# Patient Record
Sex: FEMALE | Race: WHITE | Employment: UNEMPLOYED | URBAN - METROPOLITAN AREA
[De-identification: names, ages, dates, MRNs, and addresses within clinical notes are randomized per-mention and may not be internally consistent; named-entity substitution may affect disease eponyms.]

---

## 2020-09-21 ENCOUNTER — TELEPHONE (OUTPATIENT)
Dept: FAMILY MEDICINE CLINIC | Age: 25
End: 2020-09-21

## 2020-11-27 ENCOUNTER — HOSPITAL ENCOUNTER (OUTPATIENT)
Age: 25
Setting detail: SPECIMEN
Discharge: HOME | End: 2020-11-27
Payer: OTHER GOVERNMENT

## 2020-11-27 ENCOUNTER — OFFICE VISIT (OUTPATIENT)
Dept: PRIMARY CARE CLINIC | Age: 25
End: 2020-11-27
Payer: OTHER GOVERNMENT

## 2020-11-27 VITALS
OXYGEN SATURATION: 99 % | DIASTOLIC BLOOD PRESSURE: 84 MMHG | WEIGHT: 170 LBS | BODY MASS INDEX: 31.28 KG/M2 | HEIGHT: 62 IN | SYSTOLIC BLOOD PRESSURE: 117 MMHG | TEMPERATURE: 99.1 F | HEART RATE: 98 BPM | RESPIRATION RATE: 18 BRPM

## 2020-11-27 LAB — S PYO AG THROAT QL: NORMAL

## 2020-11-27 PROCEDURE — 99214 OFFICE O/P EST MOD 30 MIN: CPT | Performed by: NURSE PRACTITIONER

## 2020-11-27 PROCEDURE — 87880 STREP A ASSAY W/OPTIC: CPT | Performed by: NURSE PRACTITIONER

## 2020-11-27 ASSESSMENT — ENCOUNTER SYMPTOMS
SORE THROAT: 1
COUGH: 0
ALLERGIC/IMMUNOLOGIC NEGATIVE: 1
EYE DISCHARGE: 0
EYE ITCHING: 0
EYES NEGATIVE: 1
VOMITING: 0
SHORTNESS OF BREATH: 0
ABDOMINAL PAIN: 0
CHEST TIGHTNESS: 0
DIARRHEA: 1
NAUSEA: 0

## 2020-11-27 NOTE — PATIENT INSTRUCTIONS
Advance Care Planning  People with COVID-19 may have no symptoms, mild symptoms, such as fever, cough, and shortness of breath or they may have more severe illness, developing severe and fatal pneumonia. As a result, Advance Care Planning with attention to naming a health care decision maker (someone you trust to make healthcare decisions for you if you could not speak for yourself) and sharing other health care preferences is important BEFORE a possible health crisis. Please contact your Primary Care Provider to discuss Advance Care Planning. Preventing the Spread of Coronavirus Disease 2019 in Homes and Residential Communities  For the most recent information go to [email or web address]    Prevention steps for People with confirmed or suspected COVID-19 (including persons under investigation) who do not need to be hospitalized  and   People with confirmed COVID-19 who were hospitalized and determined to be medically stable to go home    Your healthcare provider and public health staff will evaluate whether you can be cared for at home. If it is determined that you do not need to be hospitalized and can be isolated at home, you will be monitored by staff from your local or state health department. You should follow the prevention steps below until a healthcare provider or local or state health department says you can return to your normal activities. Stay home except to get medical care  People who are mildly ill with COVID-19 are able to isolate at home during their illness. You should restrict activities outside your home, except for getting medical care. Do not go to work, school, or public areas. Avoid using public transportation, ride-sharing, or taxis. Separate yourself from other people and animals in your home  People: As much as possible, you should stay in a specific room and away from other people in your home.  Also, you should use a separate bathroom, if available. Animals: You should restrict contact with pets and other animals while you are sick with COVID-19, just like you would around other people. Although there have not been reports of pets or other animals becoming sick with COVID-19, it is still recommended that people sick with COVID-19 limit contact with animals until more information is known about the virus. When possible, have another member of your household care for your animals while you are sick. If you are sick with COVID-19, avoid contact with your pet, including petting, snuggling, being kissed or licked, and sharing food. If you must care for your pet or be around animals while you are sick, wash your hands before and after you interact with pets and wear a facemask. Call ahead before visiting your doctor  If you have a medical appointment, call the healthcare provider and tell them that you have or may have COVID-19. This will help the healthcare providers office take steps to keep other people from getting infected or exposed. Wear a facemask  You should wear a facemask when you are around other people (e.g., sharing a room or vehicle) or pets and before you enter a healthcare providers office. If you are not able to wear a facemask (for example, because it causes trouble breathing), then people who live with you should not stay in the same room with you, or they should wear a facemask if they enter your room. Cover your coughs and sneezes  Cover your mouth and nose with a tissue when you cough or sneeze. Throw used tissues in a lined trash can. Immediately wash your hands with soap and water for at least 20 seconds or, if soap and water are not available, clean your hands with an alcohol-based hand  that contains at least 60% alcohol.   Clean your hands often  Wash your hands often with soap and water for at least 20 seconds, especially after blowing your nose, coughing, or sneezing; going to the bathroom; and have a medical emergency and need to call 911, notify the dispatch personnel that you have, or are being evaluated for COVID-19. If possible, put on a facemask before emergency medical services arrive. Discontinuing home isolation  Patients with confirmed COVID-19 should remain under home isolation precautions until the risk of secondary transmission to others is thought to be low. The decision to discontinue home isolation precautions should be made on a case-by-case basis, in consultation with healthcare providers and state and local health departments.

## 2020-11-27 NOTE — PROGRESS NOTES
Pt is here for HA, fatigue, diarrhea, body aches, and sore throat. Unsure when most symptoms started. Diarrhea started yesterday, sore throat present 2-3 days.

## 2020-11-27 NOTE — PROGRESS NOTES
[facility]YSIC[facility]ANS  [facility]  [address] [place]  [address] [address]  Dept: [phone]  Dept Fax: [phone]    Chase Whitfield is a 31 y.o. female who presents today forher medical conditions/complaints as noted below. Chase Whitfield is c/o of   Chief Complaint   Patient presents with    Headache    Fatigue    Diarrhea    Pharyngitis    Generalized Body Aches       HPI:     Pharyngitis   This is a new problem. The current episode started in the past 7 days (x's 3 days ). The problem occurs constantly. The problem has been gradually worsening. Associated symptoms include fatigue, headaches, myalgias and a sore throat. Pertinent negatives include no abdominal pain, chest pain, chills, congestion, coughing, fever, nausea, neck pain, rash, vomiting or weakness. Associated symptoms comments: diarrhea . Exposure to Covid + person. No meds for symptoms. History reviewed. No pertinent past medical history. History reviewed. No pertinent surgical history. History reviewed. No pertinent family history. Social History     Tobacco Use    Smoking status: Never Smoker    Smokeless tobacco: Never Used   Substance Use Topics    Alcohol use: Not on file      No current outpatient medications on file. No current facility-administered medications for this visit. No Known Allergies        Subjective:      Review of Systems   Constitutional: Positive for fatigue. Negative for appetite change, chills and fever. HENT: Positive for sore throat. Negative for congestion and postnasal drip. Eyes: Negative. Negative for discharge and itching. Respiratory: Negative for cough, chest tightness and shortness of breath. Cardiovascular: Negative for chest pain, palpitations and leg swelling. Gastrointestinal: Positive for diarrhea. Negative for abdominal pain, nausea and vomiting. Endocrine: Negative.     Genitourinary: Negative for dysuria, frequency and urgency. Musculoskeletal: Positive for myalgias. Negative for neck pain and neck stiffness. Skin: Negative for rash. Allergic/Immunologic: Negative. Neurological: Positive for headaches. Negative for dizziness, weakness and light-headedness. Hematological: Negative for adenopathy. Psychiatric/Behavioral: Negative for confusion. Objective:      Physical Exam  Vitals signs reviewed. Constitutional:       Appearance: She is well-developed. HENT:      Head: Normocephalic. Right Ear: External ear normal.      Left Ear: External ear normal.      Nose: Nose normal.   Eyes:      Conjunctiva/sclera: Conjunctivae normal.      Pupils: Pupils are equal, round, and reactive to light. Neck:      Musculoskeletal: Normal range of motion and neck supple. Cardiovascular:      Rate and Rhythm: Normal rate and regular rhythm. Heart sounds: Normal heart sounds, S1 normal and S2 normal. No murmur. No friction rub. No gallop. Pulmonary:      Effort: Pulmonary effort is normal. No respiratory distress. Breath sounds: Normal breath sounds. No stridor, decreased air movement or transmitted upper airway sounds. No decreased breath sounds, wheezing, rhonchi or rales. Abdominal:      General: Bowel sounds are normal.      Palpations: Abdomen is soft. Musculoskeletal: Normal range of motion. Lymphadenopathy:      Cervical: No cervical adenopathy. Skin:     General: Skin is warm and dry. Findings: No rash. Neurological:      Mental Status: She is alert and oriented to person, place, and time. Cranial Nerves: No cranial nerve deficit. Coordination: Coordination normal.      Deep Tendon Reflexes: Reflexes are normal and symmetric. Psychiatric:         Thought Content:  Thought content normal.       /84 (Site: Right Upper Arm, Position: Sitting)   Pulse 98   Temp 99.1 °F (37.3 °C) (Temporal)   Resp 18   Ht 5' 2\" (1.575 m)   Wt 170 lb (77.1 kg)   SpO2 99%   BMI 31.09 kg/m²     Assessment:       Diagnosis Orders   1. Sore throat  POCT rapid strep A    COVID-19 Ambulatory   2. Acute intractable headache, unspecified headache type  COVID-19 Ambulatory   3. Fatigue, unspecified type  COVID-19 Ambulatory   4. Diarrhea, unspecified type  COVID-19 Ambulatory   5. Generalized body aches  COVID-19 Ambulatory   6. Suspected COVID-19 virus infection  COVID-19 Ambulatory           Plan:     1.) Covid swab obtained and sent to lab- will call with results   2.) Quarantine while waiting for Covid results   3.) Symptom management encouraged   4.) Follow-up with PCP PRN     Advance Care Planning  People with COVID-19 may have no symptoms, mild symptoms, such as fever, cough, and shortness of breath or they may have more severe illness, developing severe and fatal pneumonia. As a result, Advance Care Planning with attention to naming a health care decision maker (someone you trust to make healthcare decisions for you if you could not speak for yourself) and sharing other health care preferences is important BEFORE a possible health crisis. Please contact your Primary Care Provider to discuss Advance Care Planning. Preventing the Spread of Coronavirus Disease 2019 in Homes and Residential Communities  For the most recent information go to [email or web address]    Prevention steps for People with confirmed or suspected COVID-19 (including persons under investigation) who do not need to be hospitalized  and   People with confirmed COVID-19 who were hospitalized and determined to be medically stable to go home    Your healthcare provider and public health staff will evaluate whether you can be cared for at home. If it is determined that you do not need to be hospitalized and can be isolated at home, you will be monitored by staff from your local or state health department.  You should follow the prevention steps below until a healthcare provider or local or state health department says you can return to your normal activities. Stay home except to get medical care  People who are mildly ill with COVID-19 are able to isolate at home during their illness. You should restrict activities outside your home, except for getting medical care. Do not go to work, school, or public areas. Avoid using public transportation, ride-sharing, or taxis. Separate yourself from other people and animals in your home  People: As much as possible, you should stay in a specific room and away from other people in your home. Also, you should use a separate bathroom, if available. Animals: You should restrict contact with pets and other animals while you are sick with COVID-19, just like you would around other people. Although there have not been reports of pets or other animals becoming sick with COVID-19, it is still recommended that people sick with COVID-19 limit contact with animals until more information is known about the virus. When possible, have another member of your household care for your animals while you are sick. If you are sick with COVID-19, avoid contact with your pet, including petting, snuggling, being kissed or licked, and sharing food. If you must care for your pet or be around animals while you are sick, wash your hands before and after you interact with pets and wear a facemask. Call ahead before visiting your doctor  If you have a medical appointment, call the healthcare provider and tell them that you have or may have COVID-19. This will help the healthcare providers office take steps to keep other people from getting infected or exposed. Wear a facemask  You should wear a facemask when you are around other people (e.g., sharing a room or vehicle) or pets and before you enter a healthcare providers office.  If you are not able to wear a facemask (for example, because it causes trouble breathing), then people who live with you should not stay that you have, or are being evaluated for, COVID-19. Put on a facemask before you enter the facility. These steps will help the healthcare providers office to keep other people in the office or waiting room from getting infected or exposed. Ask your healthcare provider to call the local or state health department. Persons who are placed under active monitoring or facilitated self-monitoring should follow instructions provided by their local health department or occupational health professionals, as appropriate. When working with your local health department check their available hours. If you have a medical emergency and need to call 911, notify the dispatch personnel that you have, or are being evaluated for COVID-19. If possible, put on a facemask before emergency medical services arrive. Discontinuing home isolation  Patients with confirmed COVID-19 should remain under home isolation precautions until the risk of secondary transmission to others is thought to be low. The decision to discontinue home isolation precautions should be made on a case-by-case basis, in consultation with healthcare providers and state and local health departments. Problem List     None           Patient given educationalmaterials - see patient instructions. Discussed use, benefit, and side effectsof prescribed medications. All patient questions answered. Pt verbalized understanding. Instructed to continue current medications, diet and exercise. Patient agreedwith treatment plan. Follow up as directed.      Electronically signed by STEPHANIE Torres CNP on 11/27/2020 at 1:08 PM

## 2020-11-30 LAB — SARS-COV-2, NAA: NOT DETECTED

## 2020-12-02 ENCOUNTER — NURSE TRIAGE (OUTPATIENT)
Dept: OTHER | Facility: CLINIC | Age: 25
End: 2020-12-02

## 2020-12-02 ENCOUNTER — OFFICE VISIT (OUTPATIENT)
Dept: PRIMARY CARE CLINIC | Age: 25
End: 2020-12-02
Payer: OTHER GOVERNMENT

## 2020-12-02 VITALS
SYSTOLIC BLOOD PRESSURE: 128 MMHG | WEIGHT: 173 LBS | BODY MASS INDEX: 31.83 KG/M2 | TEMPERATURE: 99 F | OXYGEN SATURATION: 97 % | HEART RATE: 100 BPM | HEIGHT: 62 IN | DIASTOLIC BLOOD PRESSURE: 78 MMHG

## 2020-12-02 PROCEDURE — 99214 OFFICE O/P EST MOD 30 MIN: CPT | Performed by: NURSE PRACTITIONER

## 2020-12-02 RX ORDER — MECLIZINE HCL 12.5 MG/1
12.5 TABLET ORAL 3 TIMES DAILY PRN
Qty: 15 TABLET | Refills: 0 | Status: SHIPPED | OUTPATIENT
Start: 2020-12-02 | End: 2020-12-12

## 2020-12-02 RX ORDER — PREDNISONE 20 MG/1
20 TABLET ORAL DAILY
Qty: 5 TABLET | Refills: 0 | Status: SHIPPED | OUTPATIENT
Start: 2020-12-02 | End: 2020-12-07

## 2020-12-02 NOTE — PROGRESS NOTES
[facility] [address]  [address]  [place] [address]  Dept: [phone]       Unique Blue is a 31 y.o. female who presents to the office today for evaluation of Otalgia (today); Pharyngitis (x1 week); and Dizziness (x1 week)    Pt had negative strep test and negative covid-19 test on 2020. Dizziness   This is a recurrent problem. The current episode started more than 1 month ago. The problem occurs intermittently. Associated symptoms include congestion, fatigue and a sore throat. She has tried nothing for the symptoms. The treatment provided no relief. Pharyngitis   This is a new problem. The current episode started in the past 7 days. Associated symptoms include congestion, fatigue and a sore throat. Otalgia    There is pain in the right ear. This is a new problem. The current episode started yesterday. There has been no fever. Associated symptoms include rhinorrhea and a sore throat. There are no active problems to display for this patient. Patient's BMI is Body mass index is 31.64 kg/m². Social History     Tobacco Use    Smoking status: Never Smoker    Smokeless tobacco: Never Used   Substance Use Topics    Alcohol use: Not on file    Drug use: Not on file      History reviewed. No pertinent past medical history. PHQ-9 Total Score: 0 (12/3/2020 10:03 AM)     Immunization:    There is no immunization history on file for this patient. History reviewed. No pertinent surgical history. History reviewed. No pertinent family history. Current Outpatient Medications   Medication Sig Dispense Refill    Prenatal Multivit-Min-Fe-FA (PRE- PO) Take 1 tablet by mouth daily      predniSONE (DELTASONE) 20 MG tablet Take 1 tablet by mouth daily for 5 days 5 tablet 0    meclizine (ANTIVERT) 12.5 MG tablet Take 1 tablet by mouth 3 times daily as needed for Dizziness 15 tablet 0     No current facility-administered medications for this visit. The patient's past medical, surgical, social, and family history as well as her current medications and allergies were reviewed as documented in today's encounter. Review of Systems   Constitutional: Positive for fatigue. HENT: Positive for congestion, ear pain, postnasal drip, rhinorrhea and sore throat. Eyes: Negative. Respiratory: Negative. Cardiovascular: Negative. Gastrointestinal: Negative. Endocrine: Negative. Genitourinary: Negative. Musculoskeletal: Negative. Skin: Negative. Allergic/Immunologic: Positive for environmental allergies. Neurological: Positive for dizziness. Hematological: Negative. Psychiatric/Behavioral: Negative. All other systems reviewed and are negative. /78 (Site: Left Upper Arm, Position: Sitting, Cuff Size: Large Adult)   Pulse 100   Temp 99 °F (37.2 °C) (Temporal)   Ht 5' 2\" (1.575 m)   Wt 173 lb (78.5 kg)   LMP 11/14/2020   SpO2 97%   BMI 31.64 kg/m²   Physical Exam  Vitals signs and nursing note reviewed. Constitutional:       Appearance: Normal appearance. HENT:      Right Ear: Ear canal and external ear normal. A middle ear effusion is present. Tympanic membrane is retracted. Left Ear: Ear canal and external ear normal. A middle ear effusion is present. Tympanic membrane is retracted. Nose: Nose normal.      Mouth/Throat:      Lips: Pink. Mouth: Mucous membranes are moist.      Pharynx: Uvula midline. Posterior oropharyngeal erythema present. No oropharyngeal exudate or uvula swelling. Tonsils: No tonsillar exudate. Eyes:      Extraocular Movements: Extraocular movements intact. Conjunctiva/sclera: Conjunctivae normal.      Pupils: Pupils are equal, round, and reactive to light. Neck:      Musculoskeletal: Normal range of motion. Cardiovascular:      Rate and Rhythm: Normal rate and regular rhythm. Pulses: Normal pulses. Heart sounds: Normal heart sounds.    Pulmonary: Effort: Pulmonary effort is normal. No respiratory distress. Breath sounds: Normal breath sounds. No stridor. No wheezing. Abdominal:      General: Abdomen is flat. Bowel sounds are normal.      Palpations: Abdomen is soft. Tenderness: There is no abdominal tenderness. Musculoskeletal: Normal range of motion. Lymphadenopathy:      Cervical: No cervical adenopathy. Skin:     General: Skin is warm and dry. Capillary Refill: Capillary refill takes less than 2 seconds. Neurological:      General: No focal deficit present. Mental Status: She is alert. Psychiatric:         Mood and Affect: Mood normal.         Behavior: Behavior normal.         Thought Content: Thought content normal.       No results found for: WBC, HGB, HCT, MCV, PLT  No results found for: NA, K, CL, CO2, BUN, CREATININE, GLUCOSE, CALCIUM   No results found for: ALT, AST, GGT, ALKPHOS, BILITOT  No results found for: TSHFT4, TSH  No results found for: CHOL  No results found for: TRIG  No results found for: HDL  No results found for: LDLCALC, LDLCHOLESTEROL  No results found for: CHOLHDLRATIO  No results found for: LABA1C  No results found for: EVXIUMCM37  No results found for: FOLATE  No results found for: IRON, TIBC, FERRITIN  No results found for: VITD25  I personally reviewed testing with patient. Otherwise labs within normal limits  ASSESSMENT AND PLAN  1. Dizziness    - Basic Metabolic Panel; Future  - CBC With Auto Differential; Future  - Vitamin D 25 Hydroxy; Future  - Vitamin B12 & Folate; Future  - Iron And TIBC; Future  - TSH With Reflex Ft4; Future  - meclizine (ANTIVERT) 12.5 MG tablet; Take 1 tablet by mouth 3 times daily as needed for Dizziness  Dispense: 15 tablet; Refill: 0    2. Viral URI      3.  Fluid level behind tympanic membrane of both ears  Discussed trying allergy medication (zyrtec) daily for a few days first, if ear pain/dizziness persists, start prednisone  - predniSONE (DELTASONE) 20 MG tablet; Take 1 tablet by mouth daily for 5 days  Dispense: 5 tablet; Refill: 0    4. Screening for hyperlipidemia    - Lipid Panel; Future     Orders Placed This Encounter   Procedures    Basic Metabolic Panel     Standing Status:   Future     Standing Expiration Date:   12/2/2021    CBC With Auto Differential     Standing Status:   Future     Standing Expiration Date:   12/2/2021    Lipid Panel     Standing Status:   Future     Standing Expiration Date:   12/2/2021     Order Specific Question:   Is Patient Fasting?/# of Hours     Answer:   10 hours fasting    Vitamin D 25 Hydroxy     Standing Status:   Future     Standing Expiration Date:   12/2/2021    Vitamin B12 & Folate     Standing Status:   Future     Standing Expiration Date:   12/2/2021    Iron And TIBC     Standing Status:   Future     Standing Expiration Date:   12/2/2021     Order Specific Question:   Is Patient Fasting? Answer:   yes     Order Specific Question:   No of Hours? Answer:   10    TSH With Reflex Ft4     Standing Status:   Future     Standing Expiration Date:   12/2/2021     Orders Placed This Encounter   Medications    predniSONE (DELTASONE) 20 MG tablet     Sig: Take 1 tablet by mouth daily for 5 days     Dispense:  5 tablet     Refill:  0    meclizine (ANTIVERT) 12.5 MG tablet     Sig: Take 1 tablet by mouth 3 times daily as needed for Dizziness     Dispense:  15 tablet     Refill:  0      Data Unavailable  Health Maintenance Due   Topic Date Due    Varicella vaccine (1 of 2 - 2-dose childhood series) 06/07/1995    HPV vaccine (1 - 2-dose series) 06/07/2005    HIV screen  06/07/2009    DTaP/Tdap/Td vaccine (1 - Tdap) 06/07/2013    Cervical cancer screen  06/07/2015    Flu vaccine (1) 09/01/2020      There are no discontinued medications. The patient's past medical, surgical, social, and family history as well as her   current medications and allergies were reviewed as documented in today's encounter.     Medications, labs, diagnostic studies, consultations and follow-up as documented in this encounter. Return if symptoms worsen or fail to improve. No future appointments. This note was completed by using the assistance of a speech-recognition program. However, inadvertent computerized transcription errors may be present. Although every effort was made to ensure accuracy, no guarantees can be provided that every mistake has been identified and corrected by editing.   Electronically signed by STEPHANIE Sotelo CNP on 12/3/2020 at 10:06 AM

## 2020-12-02 NOTE — PATIENT INSTRUCTIONS
Patient Education        Dizziness: Care Instructions  Your Care Instructions  Dizziness is the feeling of unsteadiness or fuzziness in your head. It is different than having vertigo, which is a feeling that the room is spinning or that you are moving or falling. It is also different from lightheadedness, which is the feeling that you are about to faint. It can be hard to know what causes dizziness. Some people feel dizzy when they have migraine headaches. Sometimes bouts of flu can make you feel dizzy. Some medical conditions, such as heart problems or high blood pressure, can make you feel dizzy. Many medicines can cause dizziness, including medicines for high blood pressure, pain, or anxiety. If a medicine causes your symptoms, your doctor may recommend that you stop or change the medicine. If it is a problem with your heart, you may need medicine to help your heart work better. If there is no clear reason for your symptoms, your doctor may suggest watching and waiting for a while to see if the dizziness goes away on its own. Follow-up care is a key part of your treatment and safety. Be sure to make and go to all appointments, and call your doctor if you are having problems. It's also a good idea to know your test results and keep a list of the medicines you take. How can you care for yourself at home? · If your doctor recommends or prescribes medicine, take it exactly as directed. Call your doctor if you think you are having a problem with your medicine. · Do not drive while you feel dizzy. · Try to prevent falls. Steps you can take include:  ? Using nonskid mats, adding grab bars near the tub, and using night-lights. ? Clearing your home so that walkways are free of anything you might trip on.  ? Letting family and friends know that you have been feeling dizzy. This will help them know how to help you. When should you call for help? Call 911 anytime you think you may need emergency care.  For example, call if:    · You passed out (lost consciousness).     · You have dizziness along with symptoms of a heart attack. These may include:  ? Chest pain or pressure, or a strange feeling in the chest.  ? Sweating. ? Shortness of breath. ? Nausea or vomiting. ? Pain, pressure, or a strange feeling in the back, neck, jaw, or upper belly or in one or both shoulders or arms. ? Lightheadedness or sudden weakness. ? A fast or irregular heartbeat.     · You have symptoms of a stroke. These may include:  ? Sudden numbness, tingling, weakness, or loss of movement in your face, arm, or leg, especially on only one side of your body. ? Sudden vision changes. ? Sudden trouble speaking. ? Sudden confusion or trouble understanding simple statements. ? Sudden problems with walking or balance. ? A sudden, severe headache that is different from past headaches. Call your doctor now or seek immediate medical care if:    · You feel dizzy and have a fever, headache, or ringing in your ears.     · You have new or increased nausea and vomiting.     · Your dizziness does not go away or comes back. Watch closely for changes in your health, and be sure to contact your doctor if:    · You do not get better as expected. Where can you learn more? Go to [email or web address]://[email or web address] (formerly [employer] org and sign in to your [employer] account. Enter Q996 in the [facility] box to learn more about \"Dizziness: Care Instructions. \"     If you do not have an account, please click on the \"Sign Up Now\" link. Current as of: June 26, 2019               Content Version: 12.6  © 5866-9658 [employer]. Care instructions adapted under license by [facility] ([facility]). If you have questions about a medical condition or this instruction, always ask your healthcare professional. [place] [address] any warranty or liability for your use of this information.          Patient Education        Dizziness: Care Instructions  Your Care Instructions  Dizziness is the feeling of unsteadiness or fuzziness in your head. It is different than having vertigo, which is a feeling that the room is spinning or that you are moving or falling. It is also different from lightheadedness, which is the feeling that you are about to faint. It can be hard to know what causes dizziness. Some people feel dizzy when they have migraine headaches. Sometimes bouts of flu can make you feel dizzy. Some medical conditions, such as heart problems or high blood pressure, can make you feel dizzy. Many medicines can cause dizziness, including medicines for high blood pressure, pain, or anxiety. If a medicine causes your symptoms, your doctor may recommend that you stop or change the medicine. If it is a problem with your heart, you may need medicine to help your heart work better. If there is no clear reason for your symptoms, your doctor may suggest watching and waiting for a while to see if the dizziness goes away on its own. Follow-up care is a key part of your treatment and safety. Be sure to make and go to all appointments, and call your doctor if you are having problems. It's also a good idea to know your test results and keep a list of the medicines you take. How can you care for yourself at home? · If your doctor recommends or prescribes medicine, take it exactly as directed. Call your doctor if you think you are having a problem with your medicine. · Do not drive while you feel dizzy. · Try to prevent falls. Steps you can take include:  ? Using nonskid mats, adding grab bars near the tub, and using night-lights. ? Clearing your home so that walkways are free of anything you might trip on.  ? Letting family and friends know that you have been feeling dizzy. This will help them know how to help you. When should you call for help? Call 911 anytime you think you may need emergency care.  For example, call if:    · You passed out (lost

## 2020-12-02 NOTE — TELEPHONE ENCOUNTER
Reason for Disposition   Patient wants to be seen   Patient wants to be seen    Answer Assessment - Initial Assessment Questions  1. LOCATION: \"Which ear is involved? \"      Right ear    2. ONSET: \"When did the ear start hurting\"       Since this AM    3. SEVERITY: \"How bad is the pain? \"  (Scale 1-10; mild, moderate or severe)    - MILD (1-3): doesn't interfere with normal activities     - MODERATE (4-7): interferes with normal activities or awakens from sleep     - SEVERE (8-10): excruciating pain, unable to do any normal activities       5/10    4. URI SYMPTOMS: \"Do you have a runny nose or cough? \"      Sore throat, dizziness    5. FEVER: \"Do you have a fever? \" If so, ask: \"What is your temperature, how was it measured, and when did it start? \"      Denies    6. CAUSE: \"Have you been swimming recently? \", \"How often do you use Q-TIPS? \", \"Have you had any recent air travel or scuba diving? \"      Denies    7. OTHER SYMPTOMS: \"Do you have any other symptoms? \" (e.g., headache, stiff neck, dizziness, vomiting, runny nose, decreased hearing)      Sore throat, dizziness    8. PREGNANCY: \"Is there any chance you are pregnant? \" \"When was your last menstrual period? \"      LMP 2 weeks ago. 11/14/20    Answer Assessment - Initial Assessment Questions  1. DESCRIPTION: \"Describe your dizziness. \"      Lightheaded, woozy    2. LIGHTHEADED: \"Do you feel lightheaded? \" (e.g., somewhat faint, woozy, weak upon standing)      Yes    3. VERTIGO: \"Do you feel like either you or the room is spinning or tilting? \" (i.e. vertigo)      Denies    4. SEVERITY: \"How bad is it? \"  \"Do you feel like you are going to faint? \" \"Can you stand and walk? \"    - MILD - walking normally    - MODERATE - interferes with normal activities (e.g., work, school)     - SEVERE - unable to stand, requires support to walk, feels like passing out now. Mild    5. ONSET:  \"When did the dizziness begin? \"      Approx 1 week ago.      6. AGGRAVATING FACTORS: \"Does anything make it worse? \" (e.g., standing, change in head position)      walking too fast makes it worse. 7. HEART RATE: \"Can you tell me your heart rate? \" \"How many beats in 15 seconds? \"  (Note: not all patients can do this)        Unable to assess    8. CAUSE: \"What do you think is causing the dizziness? \"      Unknown     9. RECURRENT SYMPTOM: \"Have you had dizziness before? \" If so, ask: \"When was the last time? \" \"What happened that time? \"      Possible related to anxiety    10. OTHER SYMPTOMS: \"Do you have any other symptoms? \" (e.g., fever, chest pain, vomiting, diarrhea, bleeding)        Ear pain, sore throat    11. PREGNANCY: \"Is there any chance you are pregnant? \" \"When was your last menstrual period? \"        LMP 11/14/20    Protocols used: EARACHE-ADULT-[place], DIZZINESS-ADULT-OH    Pt reports having dizziness, sore throat, nausea, right ear pain. Pt was tested for covid on 11/27 and was negative. Reviewed for pt to have appt with PCP today or be seen at walk in clinic. Warm transfer to [place] in [facility] [address] [facility] provided care advice and instructed to call back with worsening symptoms. Attention Provider: Thank you for allowing me to participate in the care of your patient. The patient was connected to triage in response to information provided to the [facility]. Please do not respond through this encounter as the response is not directed to a shared pool.

## 2020-12-03 ASSESSMENT — PATIENT HEALTH QUESTIONNAIRE - PHQ9
SUM OF ALL RESPONSES TO PHQ QUESTIONS 1-9: 0
1. LITTLE INTEREST OR PLEASURE IN DOING THINGS: 0
SUM OF ALL RESPONSES TO PHQ QUESTIONS 1-9: 0
2. FEELING DOWN, DEPRESSED OR HOPELESS: 0
SUM OF ALL RESPONSES TO PHQ QUESTIONS 1-9: 0
SUM OF ALL RESPONSES TO PHQ9 QUESTIONS 1 & 2: 0

## 2020-12-03 ASSESSMENT — ENCOUNTER SYMPTOMS
RHINORRHEA: 1
RESPIRATORY NEGATIVE: 1
SORE THROAT: 1
EYES NEGATIVE: 1
GASTROINTESTINAL NEGATIVE: 1

## 2020-12-08 ENCOUNTER — HOSPITAL ENCOUNTER (OUTPATIENT)
Age: 25
Discharge: HOME | End: 2020-12-08
Payer: OTHER GOVERNMENT

## 2020-12-08 LAB
ABSOLUTE EOS #: 0.12 K/UL (ref 0–0.44)
ABSOLUTE IMMATURE GRANULOCYTE: <0.03 K/UL (ref 0–0.3)
ABSOLUTE LYMPH #: 2.61 K/UL (ref 1.1–3.7)
ABSOLUTE MONO #: 0.27 K/UL (ref 0.1–1.2)
ANION GAP SERPL CALCULATED.3IONS-SCNC: 14 MMOL/L (ref 9–17)
BASOPHILS # BLD: 1 % (ref 0–2)
BASOPHILS ABSOLUTE: 0.07 K/UL (ref 0–0.2)
BUN BLDV-MCNC: 8 MG/DL (ref 6–20)
BUN/CREAT BLD: NORMAL (ref 9–20)
CALCIUM SERPL-MCNC: 9.1 MG/DL (ref 8.6–10.4)
CHLORIDE BLD-SCNC: 104 MMOL/L (ref 98–107)
CHOLESTEROL/HDL RATIO: 3.8
CHOLESTEROL: 171 MG/DL
CO2: 22 MMOL/L (ref 20–31)
CREAT SERPL-MCNC: 0.57 MG/DL (ref 0.5–0.9)
DIFFERENTIAL TYPE: ABNORMAL
EOSINOPHILS RELATIVE PERCENT: 2 % (ref 1–4)
FOLATE: >20 NG/ML
GFR AFRICAN AMERICAN: >60 ML/MIN
GFR NON-AFRICAN AMERICAN: >60 ML/MIN
GFR SERPL CREATININE-BSD FRML MDRD: NORMAL ML/MIN/{1.73_M2}
GFR SERPL CREATININE-BSD FRML MDRD: NORMAL ML/MIN/{1.73_M2}
GLUCOSE BLD-MCNC: 84 MG/DL (ref 70–99)
HCT VFR BLD CALC: 45.3 % (ref 36.3–47.1)
HDLC SERPL-MCNC: 45 MG/DL
HEMOGLOBIN: 15.3 G/DL (ref 11.9–15.1)
IMMATURE GRANULOCYTES: 0 %
IRON SATURATION: 57 % (ref 20–55)
IRON: 188 UG/DL (ref 37–145)
LDL CHOLESTEROL: 106 MG/DL (ref 0–130)
LYMPHOCYTES # BLD: 37 % (ref 24–43)
MCH RBC QN AUTO: 30.6 PG (ref 25.2–33.5)
MCHC RBC AUTO-ENTMCNC: 33.8 G/DL (ref 28.4–34.8)
MCV RBC AUTO: 90.6 FL (ref 82.6–102.9)
MONOCYTES # BLD: 4 % (ref 3–12)
NRBC AUTOMATED: 0 PER 100 WBC
PDW BLD-RTO: 12.3 % (ref 11.8–14.4)
PLATELET # BLD: 178 K/UL (ref 138–453)
PLATELET ESTIMATE: ABNORMAL
PMV BLD AUTO: 12.7 FL (ref 8.1–13.5)
POTASSIUM SERPL-SCNC: 3.7 MMOL/L (ref 3.7–5.3)
RBC # BLD: 5 M/UL (ref 3.95–5.11)
RBC # BLD: ABNORMAL 10*6/UL
SEG NEUTROPHILS: 56 % (ref 36–65)
SEGMENTED NEUTROPHILS ABSOLUTE COUNT: 3.98 K/UL (ref 1.5–8.1)
SODIUM BLD-SCNC: 140 MMOL/L (ref 135–144)
TOTAL IRON BINDING CAPACITY: 332 UG/DL (ref 250–450)
TRIGL SERPL-MCNC: 98 MG/DL
TSH SERPL DL<=0.05 MIU/L-ACNC: 1.82 MIU/L (ref 0.3–5)
UNSATURATED IRON BINDING CAPACITY: 144 UG/DL (ref 112–347)
VITAMIN B-12: 598 PG/ML (ref 232–1245)
VITAMIN D 25-HYDROXY: 28 NG/ML (ref 30–100)
VLDLC SERPL CALC-MCNC: NORMAL MG/DL (ref 1–30)
WBC # BLD: 7.1 K/UL (ref 3.5–11.3)
WBC # BLD: ABNORMAL 10*3/UL

## 2020-12-08 PROCEDURE — 80061 LIPID PANEL: CPT

## 2020-12-08 PROCEDURE — 82746 ASSAY OF FOLIC ACID SERUM: CPT

## 2020-12-08 PROCEDURE — 83540 ASSAY OF IRON: CPT

## 2020-12-08 PROCEDURE — 82306 VITAMIN D 25 HYDROXY: CPT

## 2020-12-08 PROCEDURE — 82607 VITAMIN B-12: CPT

## 2020-12-08 PROCEDURE — 36415 COLL VENOUS BLD VENIPUNCTURE: CPT

## 2020-12-08 PROCEDURE — 83550 IRON BINDING TEST: CPT

## 2020-12-08 PROCEDURE — 85025 COMPLETE CBC W/AUTO DIFF WBC: CPT

## 2020-12-08 PROCEDURE — 84443 ASSAY THYROID STIM HORMONE: CPT

## 2020-12-08 PROCEDURE — 80048 BASIC METABOLIC PNL TOTAL CA: CPT

## 2020-12-18 ENCOUNTER — TELEPHONE (OUTPATIENT)
Dept: PRIMARY CARE CLINIC | Age: 25
End: 2020-12-18

## 2020-12-18 NOTE — TELEPHONE ENCOUNTER
Pt needs an order for covid test ... pt stated that she called yesterday for her  Son who has an order and now she needs to be tested per having symptoms. .. pau bowen [birth year]. ...

## 2020-12-18 NOTE — TELEPHONE ENCOUNTER
I called pt per request of the placed order for a covid test and pt was unavailable and was left a message. ... Pt was also informed  to call us back if any further question needed to be answered.

## 2021-02-08 ENCOUNTER — TELEPHONE (OUTPATIENT)
Dept: PRIMARY CARE CLINIC | Age: 26
End: 2021-02-08

## 2021-02-08 DIAGNOSIS — Z01.419 WELL WOMAN EXAM: Primary | ICD-10-CM

## 2021-02-11 ENCOUNTER — INITIAL PRENATAL (OUTPATIENT)
Dept: OBGYN CLINIC | Age: 26
End: 2021-02-11
Payer: OTHER GOVERNMENT

## 2021-02-11 VITALS — SYSTOLIC BLOOD PRESSURE: 120 MMHG | WEIGHT: 188 LBS | BODY MASS INDEX: 34.39 KG/M2 | DIASTOLIC BLOOD PRESSURE: 62 MMHG

## 2021-02-11 DIAGNOSIS — Z3A.08 8 WEEKS GESTATION OF PREGNANCY: ICD-10-CM

## 2021-02-11 DIAGNOSIS — Z34.91 NORMAL PREGNANCY IN FIRST TRIMESTER: Primary | ICD-10-CM

## 2021-02-11 PROCEDURE — 99203 OFFICE O/P NEW LOW 30 MIN: CPT | Performed by: NURSE PRACTITIONER

## 2021-02-11 RX ORDER — UBIDECARENONE 75 MG
50 CAPSULE ORAL DAILY
COMMUNITY
End: 2021-10-07 | Stop reason: ALTCHOICE

## 2021-02-11 RX ORDER — ACETAMINOPHEN 160 MG
TABLET,DISINTEGRATING ORAL
COMMUNITY

## 2021-02-11 NOTE — PROGRESS NOTES
OB History    Para Term  AB Living   3       1 1   SAB TAB Ectopic Molar Multiple Live Births   1                # Outcome Date GA Lbr Keith/2nd Weight Sex Delivery Anes PTL Lv   3 Current            2 SAB            1                Chichi Roblero is being seen today for her new obstetrical visit. The pregnancy was not, not planned  Pregnancy. Her  is being deployed for 3 months in May. She is  accepting at this time. Her last period was Patient's last menstrual period was 2020. Alanna Walters This was a sure and definite menses. She was not on OCP at conception. Gestation 8w2d  Estimated Date of Delivery: 21  Last PAP 2020- negative hx abnormal: denies    Full term vaginal delivery- Son    Plans to deliver at: unsure yet    SO/Partner:  Ethan Rodriguez-   Involved:  yes  Patient Ethnicity:    FOB Ethnicity:         Occupation:  Does not work outside the home       Pets:  none    Teratogen exposure since LMP: (OTC/prescription/ETOH/drugs):  denies    History of prior GBS-infected child: unknown  Recent travel outside of country (partner also):  no    Known COVID/Zika exposure (partner also): they had covid in 2020  Any history of genetic or chromosomal aberations in herself the father to be or their respective families: no  Any histories of spina bifida or abdominal wall defects; omphalocele's or gastroschisis no  Hx Hypothyroidism: denies  Hx preeclampsia or HTN:  denies  Hx PPD: denies  Hx Diabetes: denies  Hx GDM: denies  First degree relative with diabetes: denies           No Known Allergies  Current Outpatient Medications   Medication Sig Dispense Refill    Prenatal Multivit-Min-Fe-FA (PRE-LAITH PO) Take 1 tablet by mouth daily       No current facility-administered medications for this visit. No past medical history on file. No past surgical history on file.       Swelling: no  Bleeding: no  Discharge: no   Dysuria: no Nausea: yes -  encouraged small frequent meals, and vitamin B6 and unisom if needed. Heartburn: no  Epigastric pain: no       Office protocol reviewed, After hours number provided. Diet and exercise reveiwed  Warning signs reviewed  Testing reviewed     Q&A  Discussed in detail referral/orders for  for 1st trimester screen vs NIPSinfo provided for screening  Discussed with patient that if they proceed with the NIPs testing then they will be opting out of 1st trimester screening which can provide information in regards to placental health, congenital heart defects, metabolic abnormalities, and anatomic abnormalities. Patient is aware and has decided to: declined  Discussed dates and orders for Anatomy USd and fetal echo if indicated. Breastfeeding education. She verbally consented to HIV testing and drug screening. She was counseled on Toxoplasmosis/Listeriosis (cats/raw meat). Prenatal Vitamins recommended. Prenatal labs and dating us ordered. RV prn/ 4 weeks     Of the 30 minute duration appointment visit, Adenike Ferrari CNP spent at least 50% of the face-to-face time in counseling, explanation of diagnosis, planning of further management, and answering all questions.

## 2021-02-11 NOTE — PATIENT INSTRUCTIONS
After Hours Number: You can call the office [phone]  or [phone]  Call if you have:  1. Bleeding like a period  2. Cramps or contractions greater than 2 hours  3. If you are leaking fluid  4. If you've a fever greater than 100°  5. If you feel as if baby is not moving  6. If you have continuous vomiting over 3-4 hours        Patient was counseled on weight gain in pregnancy with the following recommendation made based on her BMI:     Singletons:  BMI <18.5: 28-40 lbs weight gain  BMI 18.5-24.9: 25-35 lbs weight gain   BMI 25-29.9: 15-25 lbs weight gain  BMI >/= 30: 11-20 lbs weight gain    Up to 16 weeks around 1 pound a month  16-28 weeks- 2-4 pounds a month  >28 weeks - around 1 pound a week due to increased growth and blood volume      Twins:  BMI <18.5: no reccomendations  BMI 18.5-24.9: 37-45 lbs weight gain  BMI 25-29.9: 31-50 lbs weight gain  BMI >/= 30: 25-42 lbs weight gain    During Pregnancy: Exercises  Your Care Instructions  Here are some examples of exercises to do during your pregnancy. Start each exercise slowly. Ease off the exercise if you start to have pain. Your doctor or physical therapist will tell you when you can start these exercises and which ones will work best for you. How to do the exercises  Neck rotation    1. Sit in a firm chair, or stand up straight. 2. Keeping your chin level, turn your head to the right, and hold for 15 to 30 seconds. 3. Turn your head to the left and hold for 15 to 30 seconds. 4. Repeat 2 to 4 times to each side. Forward neck flexion    1. Sit in a firm chair, or stand up straight. 2. Bend your head forward. 3. Hold for 15 to 30 seconds. 4. Repeat 2 to 4 times. Back press    1. Place your feet 10 to 12 inches from the wall. 2. Rest your back flat against the wall and slide down the wall until your knees are slightly bent. 3. Press your lower back against the wall by pulling in your stomach muscles. 4. Hold for 6 seconds, and then relax your stomach muscles and slide back up the wall. 5. Repeat 8 to 12 times. Full body twist    1. Sit with your legs crossed. 2. Reach your left hand toward your right foot, and place your right hand at your side for support. 3. Slowly twist your torso to your right. 4. Switch your hands and twist to your left. 5. Repeat 2 to 4 times. Pelvic rocking    1. Kneeling on hands and knees, place your hands directly under your shoulders and your knees under your hips. 2. Breathe in deeply. Tuck your head downward and round your back up, making a curve with your back in the shape of the letter C. Hold this position for a count of 6.  3. Breathe out slowly and bring your head back up. Relax, keeping your back straight (don't allow it to curve toward the floor). Hold this for a count of 6.  4. Do this exercise 8 times or to your comfort level. Pelvic tilt    1. Lie on your back. 2. Keep your knees relaxed. 3. Tighten your belly and buttocks muscles. 4. At the same time, gently shift your pelvis upward. This should flatten the curve in your back. 5. Hold for 6 seconds and then relax. 6. Gradually increase the number of tilts you do each day, to your comfort level. Backward stretch    1. Kneel on hands and knees with your knees 8 to 10 inches apart, hands directly under your shoulders, and arms and back straight. 2. Keeping your arms straight, slowly lower your buttocks toward your heels and tuck your head toward your knees. Hold for 15 to 30 seconds. 3. Slowly return to the kneeling position. 4. Repeat 2 to 4 times. Forward bend    1. Sit comfortably in a chair, with your arms relaxed. 2. Slowly bend forward, allowing your arms to hang down in front of you. Lean only as far as you can without feeling discomfort or pressure on your belly. 3. Hold for 15 to 30 seconds and then slowly sit up straight. 4. Repeat 2 to 4 times or to your comfort level. Leg lift crawl    1. Kneeling on hands and knees, place your hands directly under your shoulders and straighten your arms. 2. Tighten your belly muscles by pulling in your belly button toward your spine. Be sure you continue to breathe normally and do not hold your breath. 3. Lift your left knee and bring it toward your elbow. 4. Slowly extend your leg behind you without completely straightening it. Be careful not to let your hip drop down. Avoid arching your back. 5. Hold your leg behind you for about 6 seconds. 6. Return to your starting position. 7. Do the same exercise with your other leg. 8. Repeat 8 to 12 times for each leg. Tailor sitting    1. Sit on the floor. 2. Bring your feet close to your body while crossing your ankles. 3. Hold this position for as long as you are comfortable. Tailor stretching    1. Sit on the floor with your back straight, legs about 12 inches apart, and feet relaxed outward. 2. Stretch your hands forward toward your left foot, then sit up. 3. Stretch your hands straight forward, then sit up. 4. Stretch your hands forward toward your right foot, then sit up. 5. Hold each stretch for 15 to 30 seconds. 6. Repeat 2 to 4 times. Follow-up care is a key part of your treatment and safety. Be sure to make and go to all appointments, and call your doctor if you are having problems. It's also a good idea to know your test results and keep a list of the medicines you take. Where can you learn more? Go to [email or web address]://halie.[email or web address] org and sign in to your LegalFÃ¡cil account. Enter S494 in the [facility] box to learn more about \"During Pregnancy: Exercises. \"     If you do not have an account, please click on the \"Sign Up Now\" link.   Current as of: September 5, 2018  Content Version: 12.0 Go to [email or web address] and sign in to your MobileSpan account. Enter Y785 in the [facility] box to learn more about \"Nutrition During Pregnancy: Care Instructions. \"     If you do not have an account, please click on the \"Sign Up Now\" link. Current as of: September 5, 2018  Content Version: 12.0  © 0912-9774 EPS. Care instructions adapted under license by [facility] ([facility]). If you have questions about a medical condition or this instruction, always ask your healthcare professional. [place] [address] any warranty or liability for your use of this information. Managing Morning Sickness: Care Instructions  Your Care Instructions    For many women, the toughest part of early pregnancy is morning sickness. Morning sickness can range from mild nausea to severe nausea with bouts of vomiting. Symptoms may be worse in the morning, although they can strike at any time of the day or night. If you have nausea, vomiting, or both, look for safe measures that can bring you relief. You can take simple steps at home to manage morning sickness. These steps include changing what and when you eat and avoiding certain foods and smells. Some women find that acupuncture and acupressure wristbands also help. Follow-up care is a key part of your treatment and safety. Be sure to make and go to all appointments, and call your doctor if you are having problems. It's also a good idea to know your test results and keep a list of the medicines you take. How can you care for yourself at home? · Keep food in your stomach, but not too much at once. Your nausea may be worse if your stomach is empty. Eat five or six small meals a day instead of three large meals. · For morning nausea, eat a small snack, such as a couple of crackers or dry biscuits, before rising. Allow a few minutes for your stomach to settle before you get out of bed slowly. Go to [email or web address] and sign in to your HireArt account. Enter Z844 in the [facility] box to learn more about \"Managing Morning Sickness: Care Instructions. \"     If you do not have an account, please click on the \"Sign Up Now\" link. Current as of: September 5, 2018  Content Version: 12.0  © 8822-3118 [employer]. Care instructions adapted under license by [facility] ([facility]). If you have questions about a medical condition or this instruction, always ask your healthcare professional. [place] [address] any warranty or liability for your use of this information. Breastfeeding: Care Instructions  Overview      Breastfeeding has many benefits. It may lower your baby's chances of getting an infection. It also may make it less likely that your baby will have problems such as diabetes and obesity later in life. Breastfeeding also helps you bond with your baby. In the first days after birth, your breasts make a thick, yellow liquid called colostrum. This liquid gives your baby nutrients and antibodies against infection. It is all that babies need in the first days after birth. Your breasts will fill with milk a few days after the birth. Breastfeeding is a skill that gets better with practice. Be patient with yourself and your baby. If you have trouble, you can get help and keep breastfeeding. Follow-up care is a key part of your treatment and safety. Be sure to make and go to all appointments, and call your doctor if you are having problems. It's also a good idea to know your test results and keep a list of the medicines you take. How can you care for yourself at home? · Breastfeed your baby whenever he or she is hungry. In the first 2 weeks, your baby will feed about every 1 to 3 hours. That often works out to about 8 to 12 times in a 24-hour period. This will help you keep up your supply of milk.  Signs that your baby is hungry include: · Screening tests and when they are done:  ? Blood tests at 10 to 13 weeks (first trimester)  ? Cell free fetal DNA test at 10 weeks or later (first trimester)  ? Nuchal translucency test at 11 to 14 weeks (first trimester)  ? Triple or quad screening at 15 to 20 weeks (second trimester)  ? Ultrasound at 18 to 20 weeks (second trimester)  · Diagnostic tests and when they are done:  ? Chorionic villus sampling (CVS) at 10 to 13 weeks (first trimester)  ? Amniocentesis at 13 to 20 weeks (second trimester)  In some cases, the doctors look at the combined screenings that you've had over a period of time. This is called an integrated screening. What are the screening tests? · Blood tests are done to look at different substances in your blood. These tests include cell free fetal DNA and triple or quadruple (quad) blood tests. Both of these tests can help find genetic problems. · Nuchal translucency test uses ultrasound to measure the thickness of the area at the back of the baby's neck. An increase in thickness can be an early sign of certain birth defects. Ultrasound is a tool that uses sound waves to make pictures of your baby and placenta inside the uterus. · Ultrasound lets your doctor see an image of your baby. It can help your doctor look for problems of the heart, spine, belly, or other areas. What are the diagnostic tests? Chorionic villus sampling (CVS) looks at cells from the placenta. To do the test, your doctor may put a thin tube through your vagina and cervix to take out a small piece of the placenta. Or the doctor may take out the piece through a needle in your belly. This test can diagnose many genetic diseases. But it can't find problems with the spinal cord. Amniocentesis looks at the amniotic fluid that surrounds your baby. Your doctor will put a needle through your belly into your uterus and take out a very small amount of fluid to test.  What are the risks of these tests? There is a small risk of a miscarriage after a CVS or amniocentesis. Your doctor or genetic counselor can help you understand this risk. These tests are generally very safe. Where can you learn more? Go to https://Kewl InnovationspeWabi Sabi Ecofashionconcept.Ffrees Family [email or web address] org and sign in to your Powered account. Enter G030 in the [employer] box to learn more about \"Learning About Birth Defects Testing. \"     If you do not have an account, please click on the \"Sign Up Now\" link. Current as of: September 5, 2018  Content Version: 12.0  © 7249-1837 Healthwise, Incorporated. Care instructions adapted under license by [facility] ([facility]). If you have questions about a medical condition or this instruction, always ask your healthcare professional. [address] any warranty or liability for your use of this information.

## 2021-02-24 ENCOUNTER — HOSPITAL ENCOUNTER (OUTPATIENT)
Age: 26
Setting detail: SPECIMEN
Discharge: HOME | End: 2021-02-24
Payer: OTHER GOVERNMENT

## 2021-02-24 DIAGNOSIS — Z34.91 NORMAL PREGNANCY IN FIRST TRIMESTER: ICD-10-CM

## 2021-02-24 DIAGNOSIS — Z3A.08 8 WEEKS GESTATION OF PREGNANCY: ICD-10-CM

## 2021-02-24 LAB
-: ABNORMAL
ABO/RH: NORMAL
ABSOLUTE EOS #: 0.03 K/UL (ref 0–0.44)
ABSOLUTE IMMATURE GRANULOCYTE: 0.03 K/UL (ref 0–0.3)
ABSOLUTE LYMPH #: 1.91 K/UL (ref 1.1–3.7)
ABSOLUTE MONO #: 0.35 K/UL (ref 0.1–1.2)
AMORPHOUS: ABNORMAL
AMPHETAMINE SCREEN URINE: NEGATIVE
ANTIBODY SCREEN: NEGATIVE
BACTERIA: ABNORMAL
BARBITURATE SCREEN URINE: NEGATIVE
BASOPHILS # BLD: 1 % (ref 0–2)
BASOPHILS ABSOLUTE: 0.04 K/UL (ref 0–0.2)
BENZODIAZEPINE SCREEN, URINE: NEGATIVE
BILIRUBIN URINE: NEGATIVE
BUPRENORPHINE URINE: NORMAL
CANNABINOID SCREEN URINE: NEGATIVE
CASTS UA: ABNORMAL /LPF (ref 0–8)
COCAINE METABOLITE, URINE: NEGATIVE
COLOR: YELLOW
COMMENT UA: ABNORMAL
CRYSTALS, UA: ABNORMAL /HPF
DIFFERENTIAL TYPE: ABNORMAL
EOSINOPHILS RELATIVE PERCENT: 0 % (ref 1–4)
EPITHELIAL CELLS UA: ABNORMAL /HPF (ref 0–5)
GLUCOSE URINE: NEGATIVE
HCT VFR BLD CALC: 43.9 % (ref 36.3–47.1)
HEMOGLOBIN: 15.2 G/DL (ref 11.9–15.1)
HEPATITIS B SURFACE ANTIGEN: NONREACTIVE
HIV AG/AB: NONREACTIVE
IMMATURE GRANULOCYTES: 0 %
KETONES, URINE: NEGATIVE
LEUKOCYTE ESTERASE, URINE: ABNORMAL
LYMPHOCYTES # BLD: 24 % (ref 24–43)
MCH RBC QN AUTO: 30.8 PG (ref 25.2–33.5)
MCHC RBC AUTO-ENTMCNC: 34.6 G/DL (ref 28.4–34.8)
MCV RBC AUTO: 88.9 FL (ref 82.6–102.9)
MDMA URINE: NORMAL
METHADONE SCREEN, URINE: NEGATIVE
METHAMPHETAMINE, URINE: NORMAL
MONOCYTES # BLD: 4 % (ref 3–12)
MUCUS: ABNORMAL
NITRITE, URINE: NEGATIVE
NRBC AUTOMATED: 0 PER 100 WBC
OPIATES, URINE: NEGATIVE
OTHER OBSERVATIONS UA: ABNORMAL
OXYCODONE SCREEN URINE: NEGATIVE
PDW BLD-RTO: 12.5 % (ref 11.8–14.4)
PH UA: 6 (ref 5–8)
PHENCYCLIDINE, URINE: NEGATIVE
PLATELET # BLD: 184 K/UL (ref 138–453)
PLATELET ESTIMATE: ABNORMAL
PMV BLD AUTO: 13 FL (ref 8.1–13.5)
PROPOXYPHENE, URINE: NORMAL
PROTEIN UA: NEGATIVE
RBC # BLD: 4.94 M/UL (ref 3.95–5.11)
RBC # BLD: ABNORMAL 10*6/UL
RBC UA: ABNORMAL /HPF (ref 0–4)
RENAL EPITHELIAL, UA: ABNORMAL /HPF
RUBV IGG SER QL: 32.2 IU/ML
SEG NEUTROPHILS: 71 % (ref 36–65)
SEGMENTED NEUTROPHILS ABSOLUTE COUNT: 5.72 K/UL (ref 1.5–8.1)
SPECIFIC GRAVITY UA: 1.02 (ref 1–1.03)
T. PALLIDUM, IGG: NONREACTIVE
TEST INFORMATION: NORMAL
TRICHOMONAS: ABNORMAL
TRICYCLIC ANTIDEPRESSANTS, UR: NORMAL
TURBIDITY: CLEAR
URINE HGB: NEGATIVE
UROBILINOGEN, URINE: NORMAL
WBC # BLD: 8.1 K/UL (ref 3.5–11.3)
WBC # BLD: ABNORMAL 10*3/UL
WBC UA: ABNORMAL /HPF (ref 0–5)
YEAST: ABNORMAL

## 2021-02-25 ENCOUNTER — ROUTINE PRENATAL (OUTPATIENT)
Dept: OBGYN CLINIC | Age: 26
End: 2021-02-25

## 2021-02-25 VITALS — SYSTOLIC BLOOD PRESSURE: 118 MMHG | DIASTOLIC BLOOD PRESSURE: 64 MMHG | WEIGHT: 190.5 LBS | BODY MASS INDEX: 34.84 KG/M2

## 2021-02-25 DIAGNOSIS — Z3A.10 10 WEEKS GESTATION OF PREGNANCY: ICD-10-CM

## 2021-02-25 DIAGNOSIS — Z34.91 NORMAL PREGNANCY IN FIRST TRIMESTER: Primary | ICD-10-CM

## 2021-02-25 DIAGNOSIS — O21.9 NAUSEA/VOMITING IN PREGNANCY: ICD-10-CM

## 2021-02-25 DIAGNOSIS — R10.2 PELVIC PRESSURE IN FEMALE: ICD-10-CM

## 2021-02-25 DIAGNOSIS — N39.46 MIXED STRESS AND URGE INCONTINENCE: ICD-10-CM

## 2021-02-25 DIAGNOSIS — K59.00 CONSTIPATION DURING PREGNANCY IN FIRST TRIMESTER: ICD-10-CM

## 2021-02-25 DIAGNOSIS — O99.611 CONSTIPATION DURING PREGNANCY IN FIRST TRIMESTER: ICD-10-CM

## 2021-02-25 LAB
C. TRACHOMATIS DNA ,URINE: NEGATIVE
CULTURE: NORMAL
Lab: NORMAL
N. GONORRHOEAE DNA, URINE: NEGATIVE
SPECIMEN DESCRIPTION: NORMAL
SPECIMEN DESCRIPTION: NORMAL

## 2021-02-25 PROCEDURE — 0502F SUBSEQUENT PRENATAL CARE: CPT | Performed by: OBSTETRICS & GYNECOLOGY

## 2021-02-25 RX ORDER — PROMETHAZINE HYDROCHLORIDE 25 MG/1
25 TABLET ORAL 2 TIMES DAILY PRN
Qty: 30 TABLET | Refills: 0 | Status: SHIPPED | OUTPATIENT
Start: 2021-02-25 | End: 2021-03-04

## 2021-02-25 RX ORDER — AMOXICILLIN 250 MG
2 CAPSULE ORAL DAILY PRN
Qty: 60 TABLET | Refills: 1 | Status: SHIPPED | OUTPATIENT
Start: 2021-02-25

## 2021-02-25 NOTE — PROGRESS NOTES
Quyen Wayne is a 31 y.o. female 10w3d        OB History    Para Term  AB Living   3 1 1   1 1   SAB TAB Ectopic Molar Multiple Live Births   1                # Outcome Date GA Lbr Keith/2nd Weight Sex Delivery Anes PTL Lv   3 Current            2 Term 2019     Vag-Spont      1 SAB                      Vitals  BP: 118/64  Weight: 190 lb 8 oz (86.4 kg)  Patient Position: Sitting  Albumin: Negative  Glucose: Negative      The patient was seen and evaluated. No contractions or leakage of fluid. Signs and symptoms of  labor as well as labor were reviewed. The Nuchal Translucency testing was reviewed and found to be declined. A single marker MSAFP was ordered for a 15-20 week gestational age window. TOP ST OH Reviewed. Dates were reviewed with the patient. An 18-22 week anatomy ultrasound has been ordered. The patient will return to the office for her next visit in 4 weeks. See antepartum flow sheet.   History of SAB x1  Pt requesting NIPT  Declining first trimester screening  PFT recommend during pregnancy and postpartum  Covid + in December          Assessment:  1. Quyen Wayne is a 31 y.o. female  2.   3. 10w3d    There are no active problems to display for this patient. Diagnosis Orders   1. Normal pregnancy in first trimester  [facility] [place]   2. 10 weeks gestation of pregnancy  [facility] [place]   3. Mixed stress and urge incontinence  [facility] [place]   4. Pelvic pressure in female  [facility] [place]   5. Nausea/vomiting in pregnancy  promethazine (PHENERGAN) 25 MG tablet   6. Constipation during pregnancy in first trimester  senna-docusate (PERICOLACE) 8.6-50 MG per tablet         Plan:  RTO 4 weeks  NIPT requested  PFT recommended         Patient was seen with total face to face time of 20 minutes.  More than 50% of this visit was on counseling and education regarding her    Diagnosis Orders   1. Normal pregnancy in first trimester  [facility] [place]   2. 10 weeks gestation of pregnancy  [facility] [place]   3. Mixed stress and urge incontinence  [facility] Physical [facility] [place]   4. Pelvic pressure in female  [facility] Physical [facility] [place]   5. Nausea/vomiting in pregnancy  promethazine (PHENERGAN) 25 MG tablet   6. Constipation during pregnancy in first trimester  senna-docusate (PERICOLACE) 8.6-50 MG per tablet    and her options. She was also counseled on her preventative health maintenance recommendations and follow-up.

## 2021-03-01 ENCOUNTER — TELEPHONE (OUTPATIENT)
Dept: PRIMARY CARE CLINIC | Age: 26
End: 2021-03-01

## 2021-03-03 NOTE — TELEPHONE ENCOUNTER
Patient made appointment 3-10-21 with Dr. Lakshmi Randle to discuss referral for rehab for her pelvic pain

## 2021-03-05 LAB — CYSTIC FIBROSIS: NORMAL

## 2021-03-10 ENCOUNTER — OFFICE VISIT (OUTPATIENT)
Dept: PRIMARY CARE CLINIC | Age: 26
End: 2021-03-10
Payer: OTHER GOVERNMENT

## 2021-03-10 VITALS
OXYGEN SATURATION: 99 % | WEIGHT: 197 LBS | SYSTOLIC BLOOD PRESSURE: 110 MMHG | DIASTOLIC BLOOD PRESSURE: 74 MMHG | HEART RATE: 101 BPM | HEIGHT: 62 IN | TEMPERATURE: 98.4 F | BODY MASS INDEX: 36.25 KG/M2

## 2021-03-10 DIAGNOSIS — F41.9 ANXIETY: ICD-10-CM

## 2021-03-10 DIAGNOSIS — N39.3 STRESS INCONTINENCE OF URINE: Primary | ICD-10-CM

## 2021-03-10 PROCEDURE — 99213 OFFICE O/P EST LOW 20 MIN: CPT | Performed by: FAMILY MEDICINE

## 2021-03-10 RX ORDER — PROMETHAZINE HYDROCHLORIDE 25 MG/1
25 TABLET ORAL EVERY 6 HOURS PRN
COMMUNITY
End: 2021-05-10 | Stop reason: SDUPTHER

## 2021-03-10 ASSESSMENT — PATIENT HEALTH QUESTIONNAIRE - PHQ9
SUM OF ALL RESPONSES TO PHQ QUESTIONS 1-9: 0
SUM OF ALL RESPONSES TO PHQ QUESTIONS 1-9: 0
2. FEELING DOWN, DEPRESSED OR HOPELESS: 0

## 2021-03-10 NOTE — PROGRESS NOTES
Subjective:      Patient ID: Carmen Powell is a 31 y.o. female. Is 12 weeks preg  Has leakage and needs  referral for pelvic rehab  Mother in law coming and shes generally anxious about things      Review of Systems   Constitutional: Negative. All other systems reviewed and are negative. Objective:   Physical Exam  Vitals signs reviewed. Constitutional:       Appearance: Normal appearance. HENT:      Head: Normocephalic. Cardiovascular:      Rate and Rhythm: Normal rate. Neurological:      Mental Status: She is alert. Psychiatric:         Mood and Affect: Mood normal.         Behavior: Behavior normal.         Thought Content: Thought content normal.         Judgment: Judgment normal.         Assessment:      1. Stress incontinence of urine    2. Anxiety            Plan:      Ian Lewis was seen today for other.     Diagnoses and all orders for this visit:    Stress incontinence of urine    Anxiety                Electronically signed by Lorin Bacon MD on 3/10/2021 at 3:00 PM

## 2021-03-12 ENCOUNTER — ROUTINE PRENATAL (OUTPATIENT)
Dept: OBGYN CLINIC | Age: 26
End: 2021-03-12

## 2021-03-12 VITALS — SYSTOLIC BLOOD PRESSURE: 118 MMHG | BODY MASS INDEX: 34.57 KG/M2 | DIASTOLIC BLOOD PRESSURE: 72 MMHG | WEIGHT: 189 LBS

## 2021-03-12 DIAGNOSIS — Z34.91 NORMAL PREGNANCY IN FIRST TRIMESTER: Primary | ICD-10-CM

## 2021-03-12 DIAGNOSIS — Z3A.12 12 WEEKS GESTATION OF PREGNANCY: ICD-10-CM

## 2021-03-12 PROCEDURE — 0502F SUBSEQUENT PRENATAL CARE: CPT | Performed by: OBSTETRICS & GYNECOLOGY

## 2021-03-12 NOTE — PROGRESS NOTES
Juanito Carvalho is a 31 y.o. female 12w4d        OB History    Para Term  AB Living   3 1 1   1 1   SAB TAB Ectopic Molar Multiple Live Births   1                # Outcome Date GA Lbr Keith/2nd Weight Sex Delivery Anes PTL Lv   3 Current            2 Term 2019     Vag-Spont      1 SAB                      Vitals  BP: 118/72  Weight: 189 lb (85.7 kg)  Patient Position: Sitting      The patient was seen and evaluated. There was Positive fetal movements on ultrasound. No contractions or leakage of fluid. Signs and symptoms of  labor as well as labor were reviewed. The Nuchal Translucency testing was reviewed and found to be declined. A single marker MSAFP was declined for a 15-20 week gestational age window. TOP ST [place] Reviewed. Dates were reviewed with the patient. An 18-22 week anatomy ultrasound has been ordered. The patient will return to the office for her next visit in 4 weeks. See antepartum flow sheet.   History of SAB x1  Pt requesting NIPT  Declining first trimester screening  PFT recommend during pregnancy and postpartum  Covid + in December  : Yaw          Assessment:  1. Juanito Carvalho is a 31 y.o. female  2.   3. 12w4d    Patient Active Problem List    Diagnosis Date Noted    Stress incontinence of urine 03/10/2021    Anxiety 03/10/2021        Diagnosis Orders   1. Normal pregnancy in first trimester     2. 12 weeks gestation of pregnancy           Plan:  RTO 4 weeks  Recommend pelvic floor therapy          Patient was seen with total face to face time of 20 minutes. More than 50% of this visit was on counseling and education regarding her    Diagnosis Orders   1. Normal pregnancy in first trimester     2. 12 weeks gestation of pregnancy      and her options. She was also counseled on her preventative health maintenance recommendations and follow-up.

## 2021-03-30 ENCOUNTER — HOSPITAL ENCOUNTER (OUTPATIENT)
Dept: PHYSICAL THERAPY | Facility: CLINIC | Age: 26
Setting detail: THERAPIES SERIES
Discharge: HOME | End: 2021-03-30
Payer: OTHER GOVERNMENT

## 2021-03-30 NOTE — FLOWSHEET NOTE
[] [address]  [address]    [phone]  F: [phone]   [] [email or web address]  [facility] [address]   [address]  P: [phone]  F: [phone]  [x] [address]  [address] [facility]  P: [phone]  F: [phone] [] [address]  P: [phone]  F: [phone]  [] [address]  [address] [facility] [address]   [address]   [place]: [phone]  F: [phone]   [] [address]  [place]: [phone]   F: [phone]     Physical Therapy Cancel/No Show note    Date: 3/30/2021  Patient: Arie Wellington  : 1995  MRN: [id number]    Cancels/No Shows to date: 1    For today's appointment patient:    [x]  Cancelled    [] Rescheduled appointment    [] No-show     Reason given by patient:    []  Patient ill    []  Conflicting appointment    [] No transportation      [] Conflict with work    [x] No reason given    [] Weather related    [] COVID-19    [] Other:      Comments:        [] Next appointment was confirmed    Electronically signed by: Ashley Montoya PT

## 2021-04-09 ENCOUNTER — ROUTINE PRENATAL (OUTPATIENT)
Dept: OBGYN CLINIC | Age: 26
End: 2021-04-09

## 2021-04-09 VITALS — WEIGHT: 193 LBS | SYSTOLIC BLOOD PRESSURE: 116 MMHG | BODY MASS INDEX: 35.3 KG/M2 | DIASTOLIC BLOOD PRESSURE: 72 MMHG

## 2021-04-09 DIAGNOSIS — Z3A.16 16 WEEKS GESTATION OF PREGNANCY: Primary | ICD-10-CM

## 2021-04-09 DIAGNOSIS — Z34.92 NORMAL PREGNANCY IN SECOND TRIMESTER: ICD-10-CM

## 2021-04-09 PROCEDURE — 0502F SUBSEQUENT PRENATAL CARE: CPT | Performed by: OBSTETRICS & GYNECOLOGY

## 2021-04-21 NOTE — PROGRESS NOTES
Jan Cook is a 31 y.o. female 16w4d        OB History    Para Term  AB Living   3 1 1   1 1   SAB TAB Ectopic Molar Multiple Live Births   1                # Outcome Date GA Lbr Keith/2nd Weight Sex Delivery Anes PTL Lv   3 Current            2 Term 2019     Vag-Spont      1 SAB                      Vitals  BP: 116/72  Weight: 193 lb (87.5 kg)  Patient Position: Sitting  Albumin: Negative  Fetal Heart Rate: 150    No FM yet  No vb  No lof  No complaints or concerns      History of SAB x1  Pt requesting NIPT  Declining first trimester screening  PFT recommend during pregnancy and postpartum  Covid + in December  : Yaw          Assessment:  1. Jan Cook is a 31 y.o. female  2.   3. 16w4d    Patient Active Problem List    Diagnosis Date Noted    Stress incontinence of urine 03/10/2021    Anxiety 03/10/2021        Diagnosis Orders   1. 16 weeks gestation of pregnancy  Ambulatory referral to Maternal Fetal   2.  Normal pregnancy in second trimester  Ambulatory referral to Maternal Fetal         Plan:  RTO 4 weeks routine OBV  Referral to [facility] for anatomy US at 18-22 weeks

## 2021-05-10 ENCOUNTER — ROUTINE PRENATAL (OUTPATIENT)
Dept: OBGYN CLINIC | Age: 26
End: 2021-05-10

## 2021-05-10 VITALS — SYSTOLIC BLOOD PRESSURE: 112 MMHG | WEIGHT: 207 LBS | BODY MASS INDEX: 37.86 KG/M2 | DIASTOLIC BLOOD PRESSURE: 74 MMHG

## 2021-05-10 DIAGNOSIS — Z34.92 NORMAL PREGNANCY IN SECOND TRIMESTER: Primary | ICD-10-CM

## 2021-05-10 DIAGNOSIS — Z3A.21 21 WEEKS GESTATION OF PREGNANCY: ICD-10-CM

## 2021-05-10 PROCEDURE — 0502F SUBSEQUENT PRENATAL CARE: CPT | Performed by: NURSE PRACTITIONER

## 2021-05-10 RX ORDER — PROMETHAZINE HYDROCHLORIDE 25 MG/1
25 TABLET ORAL EVERY 6 HOURS PRN
Qty: 30 TABLET | Refills: 1 | Status: SHIPPED | OUTPATIENT
Start: 2021-05-10 | End: 2021-10-07 | Stop reason: ALTCHOICE

## 2021-05-10 NOTE — PROGRESS NOTES
Presents for OB visit  Gestation 21w0d  Estimated Date of Delivery: 21      History of SAB x1   NIPs- Negative, knows gender- Boy  Declining first trimester screening  PFT recommend during pregnancy and postpartum  Covid + in December  : [address] scheduled tomorrow [facility]    OB History    Para Term  AB Living   3 1 1   1 1   SAB TAB Ectopic Molar Multiple Live Births   1                # Outcome Date GA Lbr Keith/2nd Weight Sex Delivery Anes PTL Lv   3 Current            2 Term 2019     Vag-Spont      1 SAB                     No Known Allergies  Current Outpatient Medications   Medication Sig Dispense Refill    promethazine (PHENERGAN) 25 MG tablet Take 25 mg by mouth every 6 hours as needed for Nausea      senna-docusate (PERICOLACE) 8.6-50 MG per tablet Take 2 tablets by mouth daily as needed for Constipation 60 tablet 1    Probiotic Product (PROBIOTIC-10 PO) Take by mouth      Cholecalciferol (VITAMIN D3) 50 MCG (2000 UT) CAPS Take by mouth      vitamin B-12 (CYANOCOBALAMIN) 100 MCG tablet Take 50 mcg by mouth daily      Prenatal Multivit-Min-Fe-FA (PRE- PO) Take 1 tablet by mouth daily       No current facility-administered medications for this visit. No past medical history on file. Past Surgical History:   Procedure Laterality Date    WISDOM TOOTH EXTRACTION       Headaches: no  Swelling: no  Bleeding: no  Discharge: no   Dysuria: no  Nausea: yes - still has intermittently, no vomiting,  improves usually when she eats ran out of phenergan requesting refill. Heartburn: no  Epigastric pain: no  Contractions: no  Leakage of fluid: no  + fetal movement       Return 4 WEEKS    Labs as indicated cbc, ua 1 hr gtt- informed completed between 26-28 weeks    PTL signs reviewed, if indicated  Kick Count Instructions given if indicated: The patient was instructed on fetal kick counts and was given a kick sheet to complete every 8 hours.  This is to begin at 28 weeks gestation. She was instructed that the baby should move at a minimum of ten times within one hour after a meal. The patient was instructed to lay down on her left side twenty minutes after eating and count movements for up to one hour with a target value of ten movements. She was instructed to notify the office if she did not make that target after two attempts or if after any attempt there was less than four movements. After hour numbers reviewed , along with labor signs if indicated. Contractions/cramping between 5-7 minutes and persisting even with attempts of increased water and laying on side. Fluid leakage, bleeding  NST information given no    The patient was counseled on the mandatory call ahead policy. She has been instructed to call the office at anytime prior to going into the hospital so the on-call physician may direct her to the appropriate facility for care. Exceptions were reviewed including but not limited to: Decreased fetal movement, vaginal Bleeding or hemorrhage, trauma, readily expectant delivery, or any instance where she feels 911 should be utilized. Of the 20 minute duration appointment visit, Maura Grant CNP spent at least 50% of the face-to-face time in counseling, explanation of diagnosis, planning of further management, and answering all questions.

## 2021-05-10 NOTE — PATIENT INSTRUCTIONS
Patient Education      Patient Education        Weeks 18 to 22 of Your Pregnancy: Care Instructions  Overview     Your baby is continuing to develop quickly. Sometime between 18 and 22 weeks, you'll probably start to feel your baby move. At first, these small fetal movements feel like fluttering or \"butterflies. \" Some women say that they feel like gas bubbles. As your baby grows, these movements will become stronger. You may also notice that your baby hiccups. Babies at this stage can now suck their thumbs. You may find that your nausea and fatigue are gone. You may feel better overall and have more energy than you did in your first trimester. But you might now also have some new discomforts, like sleep problems or leg cramps. Talk to your doctor about things you can do at home to ease these problems. Follow-up care is a key part of your treatment and safety. Be sure to make and go to all appointments, and call your doctor if you are having problems. It's also a good idea to know your test results and keep a list of the medicines you take. How can you care for yourself at home? Ease sleep problems  · Avoid caffeine in drinks or chocolate late in the day. · Get some exercise every day. · Take a warm shower or bath before bed. · Have a light snack or glass of milk at bedtime. · Do relaxation exercises in bed to calm your mind and body. · Support your legs and back with extra pillows. Try a pillow between your legs if you sleep on your side. · Do not use sleeping pills or alcohol. They could harm your baby. Ease leg cramps  · Do not massage your calf during the cramp. · Sit on a firm bed or chair. Straighten your leg, and bend your foot (flex your ankle) slowly upward, toward your knee. Bend your toes up and down. · Stand on a cool, flat surface. Stretch your toes upward, and take small steps walking on your heels. · Use a heating pad or hot water bottle to help with muscle ache.   Prevent leg cramps  · Be sure to get enough calcium. If you are worried that you are not getting enough, talk to your doctor. · Exercise every day, and stretch your legs before bed. · Take a warm bath before bed, and try leg warmers at night. Where can you learn more? Go to https://chpepiceweb.Hylete. org and sign in to your [employer] account. Enter O592 in the Ala-Se[employer] box to learn more about \"Weeks 18 to 22 of Your Pregnancy: Care Instructions. \"     If you do not have an account, please click on the \"Sign Up Now\" link. Current as of: October 8, 2020               Content Version: 12.8  © 2006-2021 [employer]. Care instructions adapted under license by [facility] ([facility]). If you have questions about a medical condition or this instruction, always ask your healthcare professional. [place] [address] any warranty or liability for your use of this information. Second-Trimester Fetal Ultrasound: About This Test  What is it? Fetal ultrasound is a test that uses sound waves to make pictures of your baby (fetus) and placenta inside the uterus. The test is the safest way to find out the age, size, and position of your baby. You also may be able to find out the sex of your baby. (But the test isn't done just to find out a baby's sex.)  No known risks to the mother or the baby are linked to fetal ultrasound. But you may feel anxious if the test reveals a problem with your pregnancy or baby. Why is this test done? In the second trimester, a fetal ultrasound is done to:  · Estimate the number of weeks and days a fetus has developed since the beginning of the pregnancy. This is called the gestational age. · Look at the size and position of the fetus, the placenta, and the fluid that surrounds the fetus. · Find major birth defects, such as heart problems or problems with the brain and spinal cord (neural tube defects).  But the test may not be able to find many minor defects and some major birth defects. How do you prepare for the test?  In general, there's nothing you have to do before this test, unless your doctor tells you to. How is the test done? · You may be able to leave your clothes on, or you will be given a gown to wear. · You will lie on your back on a padded examination table. · A gel will be spread on your belly. It will be removed after the test.  · A small, handheld device called a transducer will be pressed against the gel on your skin and moved across your belly several times. · You may watch the monitor to see the picture of your baby during the test.  What happens after the test?  · You will probably be able to go home right away. · You most likely will be able to go back to your usual activities right away. Follow-up care is a key part of your treatment and safety. Be sure to make and go to all appointments, and call your doctor if you are having problems. It's also a good idea to keep a list of the medicines you take. Ask your doctor when you can expect to have your test results. Where can you learn more? Go to https://[email or web address].[email or web address] org and sign in to your [employer] account. Enter G571 in the [employer] box to learn more about \"Second-Trimester Fetal Ultrasound: About This Test.\"     If you do not have an account, please click on the \"Sign Up Now\" link. Current as of: October 8, 2020               Content Version: 12.8  © 2006-2021 [employer]. Care instructions adapted under license by [facility] ([facility]). If you have questions about a medical condition or this instruction, always ask your healthcare professional. [place] [address] any warranty or liability for your use of this information.

## 2021-05-11 ENCOUNTER — ROUTINE PRENATAL (OUTPATIENT)
Dept: PERINATAL CARE | Age: 26
End: 2021-05-11
Payer: OTHER GOVERNMENT

## 2021-05-11 VITALS
SYSTOLIC BLOOD PRESSURE: 123 MMHG | TEMPERATURE: 97.9 F | BODY MASS INDEX: 37.91 KG/M2 | HEART RATE: 88 BPM | WEIGHT: 206 LBS | DIASTOLIC BLOOD PRESSURE: 84 MMHG | HEIGHT: 62 IN | RESPIRATION RATE: 16 BRPM

## 2021-05-11 DIAGNOSIS — O43.112 CIRCUMVALLATE PLACENTA IN SECOND TRIMESTER: ICD-10-CM

## 2021-05-11 DIAGNOSIS — O99.212 OBESITY AFFECTING PREGNANCY IN SECOND TRIMESTER: ICD-10-CM

## 2021-05-11 DIAGNOSIS — O35.9XX0 FETAL ABNORMALITY AFFECTING MANAGEMENT OF MOTHER, SINGLE OR UNSPECIFIED FETUS: Primary | ICD-10-CM

## 2021-05-11 DIAGNOSIS — Z3A.21 21 WEEKS GESTATION OF PREGNANCY: ICD-10-CM

## 2021-05-11 DIAGNOSIS — Z36.86 SCREENING, ANTENATAL, FOR RISK OF PRE-TERM LABOR: ICD-10-CM

## 2021-05-11 LAB
ABDOMINAL CIRCUMFERENCE: NORMAL
BIPARIETAL DIAMETER: NORMAL
ESTIMATED FETAL WEIGHT: NORMAL
FEMORAL DIAMETER: NORMAL
HC/AC: NORMAL
HEAD CIRCUMFERENCE: NORMAL

## 2021-05-11 PROCEDURE — 76817 TRANSVAGINAL US OBSTETRIC: CPT | Performed by: OBSTETRICS & GYNECOLOGY

## 2021-05-11 PROCEDURE — 76811 OB US DETAILED SNGL FETUS: CPT | Performed by: OBSTETRICS & GYNECOLOGY

## 2021-05-11 NOTE — PROGRESS NOTES
Patient declined invasive prenatal diagnostic testing today (including for evaluation and testing for fetal aneuploidy, fetal microdeletions, fetal single gene disorders, fetal microarray testing, etc). Please refer to [facility] non-invasive prenatal testing (NIPT) results as well. Patient has declined maternal carrier testing (Maria Eugenia's Benitaeti 5). MSAFP (maternal serum alpha-feto protein level) lab draw also declined by patient.

## 2021-05-17 ENCOUNTER — ROUTINE PRENATAL (OUTPATIENT)
Dept: OBGYN CLINIC | Age: 26
End: 2021-05-17

## 2021-05-17 VITALS — DIASTOLIC BLOOD PRESSURE: 70 MMHG | BODY MASS INDEX: 38.59 KG/M2 | SYSTOLIC BLOOD PRESSURE: 114 MMHG | WEIGHT: 211 LBS

## 2021-05-17 DIAGNOSIS — Z34.92 NORMAL PREGNANCY IN SECOND TRIMESTER: Primary | ICD-10-CM

## 2021-05-17 DIAGNOSIS — Z3A.22 22 WEEKS GESTATION OF PREGNANCY: ICD-10-CM

## 2021-05-17 PROCEDURE — 0502F SUBSEQUENT PRENATAL CARE: CPT | Performed by: OBSTETRICS & GYNECOLOGY

## 2021-05-18 NOTE — PROGRESS NOTES
Po Fleming is a 31 y.o. female 22w1d        OB History    Para Term  AB Living   3 1 1   1 1   SAB TAB Ectopic Molar Multiple Live Births   1                # Outcome Date GA Lbr Keith/2nd Weight Sex Delivery Anes PTL Lv   3 Current            2 Term 2019     Vag-Spont      1 SAB                Vitals  BP: 114/70  Weight: 211 lb (95.7 kg)  Patient Position: Sitting  Albumin: Negative  Glucose: Negative    The patient was seen and evaluated. There was positive fetal movements. No contractions or leakage of fluid. Signs and symptoms of  labor as well as labor were reviewed. The patients anatomy ultrasound has been completed and reviewed with patient. TOP  OH Reviewed. A 28 week lab panel was ordered. This includes a (HH, 1 hr GTT, U/A C&S). The patient is to complete this in the next two to four weeks. The S/S of Pre-Eclampsia were reviewed with the patient in detail. She is to report any of these if they occur. She currently denies any of these. The patient is RH positive Rhogam Ordered no    The patient was instructed on fetal kick counts and was given a kick sheet to complete every 8 hours. This is to begin at 28 weeks gestation. She was instructed that the baby should move at a minimum of ten times within one hour after a meal. The patient was instructed to lay down on her left side twenty minutes after eating and count movements for up to one hour with a target value of ten movements. She was instructed to notify the office if she did not make that target after two attempts or if after any attempt there was less than four movements.   History of SAB x1   NIPs- Negative, knows gender- Boy  PFT recommend during pregnancy and postpartum  Covid + in December  : Regenia Kanner  Persistent right umbilical vein - fetal echocardiogram scheduled. Assessment:  1. Po Fleming is a 31 y.o. female  2. [id number]  3. 22w1d    Patient Active Problem List    Diagnosis Date Noted    Stress incontinence of urine 03/10/2021    Anxiety 03/10/2021        Diagnosis Orders   1. Normal pregnancy in second trimester  Glucose tolerance, 1 hour    CBC Auto Differential    Urinalysis Reflex to Culture   2. 22 weeks gestation of pregnancy  External Referral To Maternal Fetal Medicine         Plan:  The patient will return to the office for her next visit in 4 weeks. See antepartum flow sheet. Counseled on s/s of preeclampsia. Counseled on s/s of  labor        Patient was seen with total face to face time of 20 minutes. More than 50% of this visit was on counseling and education regarding her    Diagnosis Orders   1. Normal pregnancy in second trimester  Glucose tolerance, 1 hour    CBC Auto Differential    Urinalysis Reflex to Culture   2. 22 weeks gestation of pregnancy  External Referral To Maternal Fetal Medicine    and her options. She was also counseled on her preventative health maintenance recommendations and follow-up.

## 2021-06-08 ENCOUNTER — PATIENT MESSAGE (OUTPATIENT)
Dept: OBGYN CLINIC | Age: 26
End: 2021-06-08

## 2021-06-08 ENCOUNTER — TELEMEDICINE (OUTPATIENT)
Dept: OBGYN CLINIC | Age: 26
End: 2021-06-08

## 2021-06-08 DIAGNOSIS — Z3A.25 25 WEEKS GESTATION OF PREGNANCY: Primary | ICD-10-CM

## 2021-06-08 DIAGNOSIS — O43.112 CIRCUMVALLATE PLACENTA IN SECOND TRIMESTER: ICD-10-CM

## 2021-06-08 DIAGNOSIS — R03.0 ELEVATED BLOOD PRESSURE READING: ICD-10-CM

## 2021-06-08 PROCEDURE — 0502F SUBSEQUENT PRENATAL CARE: CPT | Performed by: NURSE PRACTITIONER

## 2021-06-08 NOTE — PROGRESS NOTES
Frank Oliveira (:  1995) is a 31 y.o. female,Established patient, here for evaluation of the following chief complaint(s): Routine Prenatal Visit         ASSESSMENT/PLAN:  1. 25 weeks gestation of pregnancy  2. Other umbilical cord complication, single or unspecified fetus  3. Circumvallate placenta in second trimester    She is currently out of town, she used her parents wrist BP monitor today for first time and had elevated BP readings from 140-160/. She has not hd any elevated blood pressure readings denies hx of HTN. Denies severe or persistent  headaches, vision changes, cp, sob, midepigastric pain or swelling. Discussed if above 160/110 she needs to got hospital as she is out of town or any symptoms,  severe or persistent  headaches, vision changes, cp, sob, midepigastric pain or swelling. She states she will call us today with BP reading from manual cuff and check it against the wrist monitor. encouraged to monitor BP this week too and notify us of results. Discussed risks preeclampsia and need to further eleavteon wit helevated BP readings     [facility] as indicated CBC,1 hr GTT, UA between 26-28 weeks- has orders to complete    PTL signs reviewed, if indicated  Kick Count Instructions given if indicated: The patient was instructed on fetal kick counts and was given a kick sheet to complete every 8 hours. This is to begin at 28 weeks gestation. She was instructed that the baby should move at a minimum of ten times within one hour after a meal. The patient was instructed to lay down on her left side twenty minutes after eating and count movements for up to one hour with a target value of ten movements. She was instructed to notify the office if she did not make that target after two attempts or if after any attempt there was less than four movements. After hour numbers reviewed , along with labor signs if indicated.    Contractions/cramping between 5-7 minutes and persisting even with attempts of increased water and laying on side. Fluid leakage, bleeding      The patient was counseled on the mandatory call ahead policy. She has been instructed to call the office at anytime prior to going into the hospital so the on-call physician may direct her to the appropriate facility for care. Exceptions were reviewed including but not limited to: Decreased fetal movement, vaginal Bleeding or hemorrhage, trauma, readily expectant delivery, or any instance where she feels 911 should be utilized. Return in about 4 weeks (around 2021) for ob check. SUBJECTIVE/OBJECTIVE:  HPI  Presents for OB visit  Gestation 25w1d  Estimated Date of Delivery: 21       History of SAB x1   NIPs- Negative, knows gender- Boy  PFT recommend during pregnancy and postpartum  Covid + in December  : Fatemeh Romero  Persistent right umbilical vein - fetal echocardiogram scheduled. .  Headaches: no  Swelling: no  Bleeding: no  Discharge: no   Dysuria: no  Nausea: no  Heartburn: no  Epigastric pain: no  Contractions: no  Leakage of fluid: no  + fetal movement         Review of Systems    No flowsheet data found.      Physical Exam    [INSTRUCTIONS:  \"[x]\" Indicates a positive item  \"[]\" Indicates a negative item  -- DELETE ALL ITEMS NOT EXAMINED]    Constitutional: [x] Appears well-developed and well-nourished [x] No apparent distress      [] Abnormal -     Mental status: [x] Alert and awake  [x] Oriented to person/place/time [x] Able to follow commands    [] Abnormal -     Eyes:   EOM    [x]  Normal    [] Abnormal -   Sclera  [x]  Normal    [] Abnormal -          Discharge [x]  None visible   [] Abnormal -     HENT: [x] Normocephalic, atraumatic  [] Abnormal -   [x] Mouth/Throat: Mucous membranes are moist    External Ears [x] Normal  [] Abnormal -    Neck: [x] No visualized mass [] Abnormal -     Pulmonary/Chest: [x] Respiratory effort normal   [x] No visualized signs of difficulty breathing or respiratory distress        [] Abnormal -      Musculoskeletal:   [x] Normal gait with no signs of ataxia         [x] Normal range of motion of neck        [] Abnormal -     Neurological:        [x] No Facial Asymmetry (Cranial nerve 7 motor function) (limited exam due to video visit)          [x] No gaze palsy        [] Abnormal -          Skin:        [x] No significant exanthematous lesions or discoloration noted on facial skin         [] Abnormal -            Psychiatric:       [x] Normal Affect [] Abnormal -        [x] No Hallucinations    Other pertinent observable physical exam findings:-          On this date 6/8/2021 I have spent 20 minutes reviewing previous notes, test results and face to face (virtual) with the patient discussing the diagnosis and importance of compliance with the treatment plan as well as documenting on the day of the visit. Arlyn Pallas, was evaluated through a synchronous (real-time) audio-video encounter. The patient (or guardian if applicable) is aware that this is a billable service. Verbal consent to proceed has been obtained within the past 12 months. The visit was conducted pursuant to the emergency declaration under the [address] [place] [address], [address] [place] [address] authority and the [employer] and [employer] General Act. Patient identification was verified, and a caregiver was present when appropriate. The patient was located in a state where the provider was credentialed to provide care. An electronic signature was used to authenticate this note.     --STEPHANIE Galindo - CNP

## 2021-06-08 NOTE — PATIENT INSTRUCTIONS

## 2021-06-09 NOTE — TELEPHONE ENCOUNTER
From: Jeet Phelps  To: STEPHANIE Ramires - CNP  Sent: 6/8/2021 9:36 PM EDT  Subject: Test Results Question    I got a manual blood pressure check it was 132/80. I also bought a manual no cuff and taught my parents how to take my BP for me. I will be taking it everyday multiple times a day.

## 2021-07-07 ENCOUNTER — TELEMEDICINE (OUTPATIENT)
Dept: OBGYN CLINIC | Age: 26
End: 2021-07-07

## 2021-07-07 DIAGNOSIS — Z3A.29 29 WEEKS GESTATION OF PREGNANCY: Primary | ICD-10-CM

## 2021-07-07 PROCEDURE — 0502F SUBSEQUENT PRENATAL CARE: CPT | Performed by: OBSTETRICS & GYNECOLOGY

## 2021-07-07 NOTE — PROGRESS NOTES
Samule Goodell (:  1995) has requested an audio/video evaluation for the following concern(s):    1. 29 weeks gestation of pregnancy          TELEHEALTH EVALUATION -- Audio/Visual (During  public health emergency)            Samule Goodell is a 31 y.o. female 29w2d        OB History    Para Term  AB Living   3 1 1   1 1   SAB TAB Ectopic Molar Multiple Live Births   1                # Outcome Date GA Lbr Keith/2nd Weight Sex Delivery Anes PTL Lv   3 Current            2 Term 2019     Vag-Spont      1 SAB                Vitals         There was positive fetal movements. No contractions or leakage of fluid. Signs and symptoms of  labor as well as labor were reviewed. The S/S of Pre-Eclampsia were reviewed with the patient in detail. She is to report any of these if they occur. She currently denies any of these. Her son currently has hand foot and mouth and she has rash on stomach discussed supportive care and need for ultrasound. She has one scheduled . She is feeling stressed and tearful. Discussed anxiety and depression and support provided. The patient had her 28 week labs ordered. [employer]     History of SAB x1   NIPs- Negative, knows gender- Boy  PFT recommend during pregnancy and postpartum  Covid + in December  : Jem Malone  Persistent right umbilical vein - fetal echocardiogram scheduled. T-Dap Vaccine (27-36 weeks): awaiting    The patient was instructed on fetal kick counts and was given a kick sheet to complete every 8 hours. She was instructed that the baby should move at a minimum of ten times within one hour after a meal. The patient was instructed to lay down on her left side twenty minutes after eating and count movements for up to one hour with a target value of ten movements.   She was instructed to notify the office if she did not make that target after two attempts or if after any attempt there was to authenticate this note. The total Virtual Visit time of 20 minutes. More than 50% of this visit was on counseling and education regarding her    Diagnosis Orders   1. 29 weeks gestation of pregnancy      and her options. She was also counseled on her preventative health maintenance recommendations and follow-up.

## 2021-07-19 ENCOUNTER — HOSPITAL ENCOUNTER (OUTPATIENT)
Age: 26
Setting detail: SPECIMEN
Discharge: HOME | End: 2021-07-19
Payer: OTHER GOVERNMENT

## 2021-07-19 DIAGNOSIS — Z34.92 NORMAL PREGNANCY IN SECOND TRIMESTER: ICD-10-CM

## 2021-07-19 LAB
ABSOLUTE EOS #: 0.09 K/UL (ref 0–0.44)
ABSOLUTE IMMATURE GRANULOCYTE: 0.07 K/UL (ref 0–0.3)
ABSOLUTE LYMPH #: 1.44 K/UL (ref 1.1–3.7)
ABSOLUTE MONO #: 0.29 K/UL (ref 0.1–1.2)
BASOPHILS # BLD: 0 % (ref 0–2)
BASOPHILS ABSOLUTE: 0.03 K/UL (ref 0–0.2)
BILIRUBIN URINE: NEGATIVE
COLOR: YELLOW
COMMENT UA: NORMAL
DIFFERENTIAL TYPE: ABNORMAL
EOSINOPHILS RELATIVE PERCENT: 1 % (ref 1–4)
GLUCOSE ADMINISTRATION: NORMAL
GLUCOSE TOLERANCE SCREEN 50G: 127 MG/DL (ref 70–135)
GLUCOSE URINE: NEGATIVE
HCT VFR BLD CALC: 43.4 % (ref 36.3–47.1)
HEMOGLOBIN: 14.2 G/DL (ref 11.9–15.1)
IMMATURE GRANULOCYTES: 1 %
KETONES, URINE: NEGATIVE
LEUKOCYTE ESTERASE, URINE: NEGATIVE
LYMPHOCYTES # BLD: 16 % (ref 24–43)
MCH RBC QN AUTO: 31.3 PG (ref 25.2–33.5)
MCHC RBC AUTO-ENTMCNC: 32.7 G/DL (ref 28.4–34.8)
MCV RBC AUTO: 95.8 FL (ref 82.6–102.9)
MONOCYTES # BLD: 3 % (ref 3–12)
NITRITE, URINE: NEGATIVE
NRBC AUTOMATED: 0 PER 100 WBC
PDW BLD-RTO: 13.7 % (ref 11.8–14.4)
PH UA: 7.5 (ref 5–8)
PLATELET # BLD: 132 K/UL (ref 138–453)
PLATELET ESTIMATE: ABNORMAL
PMV BLD AUTO: 12.9 FL (ref 8.1–13.5)
PROTEIN UA: NEGATIVE
RBC # BLD: 4.53 M/UL (ref 3.95–5.11)
RBC # BLD: ABNORMAL 10*6/UL
SEG NEUTROPHILS: 79 % (ref 36–65)
SEGMENTED NEUTROPHILS ABSOLUTE COUNT: 7.27 K/UL (ref 1.5–8.1)
SPECIFIC GRAVITY UA: 1 (ref 1–1.03)
TURBIDITY: CLEAR
URINE HGB: NEGATIVE
UROBILINOGEN, URINE: NORMAL
WBC # BLD: 9.2 K/UL (ref 3.5–11.3)
WBC # BLD: ABNORMAL 10*3/UL

## 2021-07-22 ENCOUNTER — ROUTINE PRENATAL (OUTPATIENT)
Dept: OBGYN CLINIC | Age: 26
End: 2021-07-22

## 2021-07-22 VITALS — BODY MASS INDEX: 39.87 KG/M2 | WEIGHT: 218 LBS | DIASTOLIC BLOOD PRESSURE: 60 MMHG | SYSTOLIC BLOOD PRESSURE: 112 MMHG

## 2021-07-22 DIAGNOSIS — Z3A.31 31 WEEKS GESTATION OF PREGNANCY: ICD-10-CM

## 2021-07-22 DIAGNOSIS — Z34.93 NORMAL PREGNANCY IN THIRD TRIMESTER: Primary | ICD-10-CM

## 2021-07-22 PROCEDURE — 0502F SUBSEQUENT PRENATAL CARE: CPT | Performed by: OBSTETRICS & GYNECOLOGY

## 2021-07-22 NOTE — PROGRESS NOTES
Kaiser Tariq is a 31 y.o. female 31w3d        OB History    Para Term  AB Living   3 1 1   1 1   SAB TAB Ectopic Molar Multiple Live Births   1                # Outcome Date GA Lbr Keith/2nd Weight Sex Delivery Anes PTL Lv   3 Current            2 Term 2019     Vag-Spont      1 SAB                Vitals  BP: 112/60  Weight: 218 lb (98.9 kg)  Patient Position: Sitting  Albumin: Negative  Glucose: Negative      The patient was seen and evaluated. There was positive fetal movements. No contractions or leakage of fluid. Signs and symptoms of  labor as well as labor were reviewed. The S/S of Pre-Eclampsia were reviewed with the patient in detail. She is to report any of these if they occur. She currently denies any of these. The patient had her 28 week labs completed.   Hospital Outpatient Visit on 2021   Component Date Value Ref Range Status    WBC 2021 9.2  3.5 - 11.3 k/uL Final    RBC 2021 4.53  3.95 - 5.11 m/uL Final    Hemoglobin 2021 14.2  11.9 - 15.1 g/dL Final    Hematocrit 2021 43.4  36.3 - 47.1 % Final    MCV 2021 95.8  82.6 - 102.9 fL Final    MCH 2021 31.3  25.2 - 33.5 pg Final    MCHC 2021 32.7  28.4 - 34.8 g/dL Final    RDW 2021 13.7  11.8 - 14.4 % Final    Platelets  132* 138 - 453 k/uL Final    MPV 2021 12.9  8.1 - 13.5 fL Final    NRBC Automated 2021 0.0  0.0 per 100 WBC Final    Differential Type 2021 NOT REPORTED   Final    Seg Neutrophils 2021 79* 36 - 65 % Final    Lymphocytes 2021 16* 24 - 43 % Final    Monocytes 2021 3  3 - 12 % Final    Eosinophils % 2021 1  1 - 4 % Final    Basophils 2021 0  0 - 2 % Final    Immature Granulocytes 2021 1* 0 % Final    Segs Absolute 2021 7.27  1.50 - 8.10 k/uL Final    Absolute Lymph # 2021 1.44  1.10 - 3.70 k/uL Final    Absolute Mono # 2021 0.29  0.10 - 1.20 k/uL Final    Absolute Eos # 2021 0.09  0.00 - 0.44 k/uL Final    Basophils Absolute 2021 0.03  0.00 - 0.20 k/uL Final    Absolute Immature Granulocyte 2021 0.07  0.00 - 0.30 k/uL Final    WBC Morphology 2021 NOT REPORTED   Final    RBC Morphology 2021 NOT REPORTED   Final    Platelet Estimate  NOT REPORTED   Final    Color, UA 2021 YELLOW  YELLOW Final    Turbidity UA 2021 CLEAR  CLEAR Final    Glucose, Ur 2021 NEGATIVE  NEGATIVE Final    Bilirubin Urine 2021 NEGATIVE  NEGATIVE Final    Ketones, Urine 2021 NEGATIVE  NEGATIVE Final    Specific Gravity, UA 2021 1.005  1.005 - 1.030 Final    Urine Hgb 2021 NEGATIVE  NEGATIVE Final    pH, UA 2021 7.5  5.0 - 8.0 Final    Protein, UA 2021 NEGATIVE  NEGATIVE Final    Urobilinogen, Urine 2021 Normal  Normal Final    Nitrite, Urine 2021 NEGATIVE  NEGATIVE Final    Leukocyte Esterase, Urine 2021 NEGATIVE  NEGATIVE Final    Urinalysis Comments 2021 Microscopic exam not performed based on chemical results unless requested in original order. Final    GLU ADMN 2021 Glucola   Final    Glucose tolerance screen 50g 2021 127  70 - 135 mg/dL Final   ]    [facility] INSURANCE     History of SAB x1   NIPs- Negative, knows gender- Boy  PFT recommend during pregnancy and postpartum  Covid + in December  : Dave Garciao  Persistent right umbilical vein - fetal echocardiogram scheduled. T-Dap Vaccine (27-36 weeks): awaiting    The patient was instructed on fetal kick counts and was given a kick sheet to complete every 8 hours. She was instructed that the baby should move at a minimum of ten times within one hour after a meal. The patient was instructed to lay down on her left side twenty minutes after eating and count movements for up to one hour with a target value of ten movements.   She was instructed to notify the office if she did not make that target after two attempts or if after any attempt there was less than four movements. The patient reports that the targets have been made Yes.  Testing:  none    Assessment:  1. Anastacia Ha is a 31 y.o. female  2.   3. 31w3d    Patient Active Problem List    Diagnosis Date Noted    Stress incontinence of urine 03/10/2021    Anxiety 03/10/2021        Diagnosis Orders   1. Normal pregnancy in third trimester     2. 31 weeks gestation of pregnancy     3. Other umbilical cord complication, single or unspecified fetus               Plan:  The patient will return to the office for her next visit in 2 weeks. See antepartum flow sheet. Counseled on s/s of preeclampsia. Counseled on s/s of  labor.  Testing Indicated: No  Scheduled with Nursing-Pt notified: No      Patient was seen with total face to face time of 20 minutes. More than 50% of this visit was on counseling and education regarding her    Diagnosis Orders   1. Normal pregnancy in third trimester     2. 31 weeks gestation of pregnancy     3. Other umbilical cord complication, single or unspecified fetus      and her options. She was also counseled on her preventative health maintenance recommendations and follow-up.

## 2021-08-02 ENCOUNTER — HOSPITAL ENCOUNTER (OUTPATIENT)
Age: 26
Setting detail: SPECIMEN
Discharge: HOME | End: 2021-08-02
Payer: OTHER GOVERNMENT

## 2021-08-02 ENCOUNTER — ROUTINE PRENATAL (OUTPATIENT)
Dept: PERINATAL CARE | Age: 26
End: 2021-08-02
Payer: OTHER GOVERNMENT

## 2021-08-02 VITALS
SYSTOLIC BLOOD PRESSURE: 120 MMHG | DIASTOLIC BLOOD PRESSURE: 80 MMHG | HEIGHT: 62 IN | BODY MASS INDEX: 40.2 KG/M2 | TEMPERATURE: 97.1 F | HEART RATE: 105 BPM | RESPIRATION RATE: 16 BRPM | WEIGHT: 218.48 LBS

## 2021-08-02 DIAGNOSIS — Z13.89 ENCOUNTER FOR ROUTINE SCREENING FOR MALFORMATION USING ULTRASONICS: ICD-10-CM

## 2021-08-02 DIAGNOSIS — Z3A.33 33 WEEKS GESTATION OF PREGNANCY: ICD-10-CM

## 2021-08-02 DIAGNOSIS — O35.9XX0 FETAL ABNORMALITY AFFECTING MANAGEMENT OF MOTHER, SINGLE OR UNSPECIFIED FETUS: ICD-10-CM

## 2021-08-02 DIAGNOSIS — O99.213 OBESITY AFFECTING PREGNANCY IN THIRD TRIMESTER: ICD-10-CM

## 2021-08-02 DIAGNOSIS — O43.113 CIRCUMVALLATE PLACENTA IN THIRD TRIMESTER: ICD-10-CM

## 2021-08-02 DIAGNOSIS — Z82.49 FAMILY HISTORY OF THROMBOEMBOLIC DISEASE: ICD-10-CM

## 2021-08-02 LAB
ABDOMINAL CIRCUMFERENCE: NORMAL
BIPARIETAL DIAMETER: NORMAL
ESTIMATED FETAL WEIGHT: NORMAL
FEMORAL DIAMETER: NORMAL
HC/AC: NORMAL
HEAD CIRCUMFERENCE: NORMAL
HOMOCYSTEINE: 3.7 UMOL/L

## 2021-08-02 PROCEDURE — 85610 PROTHROMBIN TIME: CPT

## 2021-08-02 PROCEDURE — 76805 OB US >/= 14 WKS SNGL FETUS: CPT | Performed by: OBSTETRICS & GYNECOLOGY

## 2021-08-02 PROCEDURE — 81241 F5 GENE: CPT

## 2021-08-02 PROCEDURE — 86147 CARDIOLIPIN ANTIBODY EA IG: CPT

## 2021-08-02 PROCEDURE — 93325 DOPPLER ECHO COLOR FLOW MAPG: CPT | Performed by: OBSTETRICS & GYNECOLOGY

## 2021-08-02 PROCEDURE — 81291 MTHFR GENE: CPT

## 2021-08-02 PROCEDURE — 85303 CLOT INHIBIT PROT C ACTIVITY: CPT

## 2021-08-02 PROCEDURE — 85301 ANTITHROMBIN III ANTIGEN: CPT

## 2021-08-02 PROCEDURE — 85305 CLOT INHIBIT PROT S TOTAL: CPT

## 2021-08-02 PROCEDURE — 76825 ECHO EXAM OF FETAL HEART: CPT | Performed by: OBSTETRICS & GYNECOLOGY

## 2021-08-02 PROCEDURE — 85613 RUSSELL VIPER VENOM DILUTED: CPT

## 2021-08-02 PROCEDURE — 83090 ASSAY OF HOMOCYSTEINE: CPT

## 2021-08-02 PROCEDURE — 99243 OFF/OP CNSLTJ NEW/EST LOW 30: CPT | Performed by: OBSTETRICS & GYNECOLOGY

## 2021-08-02 PROCEDURE — 36415 COLL VENOUS BLD VENIPUNCTURE: CPT

## 2021-08-02 PROCEDURE — 85302 CLOT INHIBIT PROT C ANTIGEN: CPT

## 2021-08-02 PROCEDURE — 85306 CLOT INHIBIT PROT S FREE: CPT

## 2021-08-02 PROCEDURE — 85300 ANTITHROMBIN III ACTIVITY: CPT

## 2021-08-02 PROCEDURE — 76820 UMBILICAL ARTERY ECHO: CPT | Performed by: OBSTETRICS & GYNECOLOGY

## 2021-08-02 PROCEDURE — 76819 FETAL BIOPHYS PROFIL W/O NST: CPT | Performed by: OBSTETRICS & GYNECOLOGY

## 2021-08-02 PROCEDURE — 81240 F2 GENE: CPT

## 2021-08-02 PROCEDURE — 76827 ECHO EXAM OF FETAL HEART: CPT | Performed by: OBSTETRICS & GYNECOLOGY

## 2021-08-02 PROCEDURE — 85730 THROMBOPLASTIN TIME PARTIAL: CPT

## 2021-08-03 LAB
AT-III ACTIVITY: 100 % (ref 83–122)
PROTEIN C ACTIVITY: >150 %
PROTEIN S ACTIVITY: 43 % (ref 59–130)
SEND OUT REPORT: NORMAL
TEST NAME: NORMAL

## 2021-08-04 LAB
ANTI-THROMBIN 3 AG: 69 % (ref 82–136)
ANTICARDIOLIPIN IGA ANTIBODY: 2.7 APL (ref 0–14)
ANTICARDIOLIPIN IGG ANTIBODY: 0.7 GPL (ref 0–10)
CARDIOLIPIN AB IGM: 3.3 MPL (ref 0–10)
DILUTE RUSSELL VIPER VENOM TIME: NORMAL
INR BLD: 0.9
LUPUS ANTICOAG: NORMAL
PARTIAL THROMBOPLASTIN TIME: 26 SEC (ref 20.5–30.5)
PROTEIN C ANTIGEN: >95 % (ref 63–153)
PROTEIN S ANTIGEN, TOTAL: 70 % (ref 63–126)
PROTHROMBIN TIME: 9.8 SEC (ref 9.1–12.3)

## 2021-08-06 LAB
FACTOR V MUTATION: NEGATIVE
MTHFR INTERPRETATION: ABNORMAL
MTHFR MUTATION A1286C: ABNORMAL
MTHFR MUTATION C665T: NEGATIVE
SPECIMEN: ABNORMAL
SPECIMEN: NORMAL

## 2021-08-07 LAB
PROTHROMBIN G20210A MUTATION: NEGATIVE
PT PCR SPECIMEN: NORMAL

## 2021-08-09 ENCOUNTER — TELEPHONE (OUTPATIENT)
Dept: PERINATAL CARE | Age: 26
End: 2021-08-09

## 2021-08-09 ENCOUNTER — ROUTINE PRENATAL (OUTPATIENT)
Dept: PERINATAL CARE | Age: 26
End: 2021-08-09
Payer: OTHER GOVERNMENT

## 2021-08-09 VITALS
HEIGHT: 62 IN | SYSTOLIC BLOOD PRESSURE: 130 MMHG | DIASTOLIC BLOOD PRESSURE: 88 MMHG | RESPIRATION RATE: 16 BRPM | TEMPERATURE: 96.8 F | WEIGHT: 222 LBS | BODY MASS INDEX: 40.85 KG/M2 | HEART RATE: 113 BPM

## 2021-08-09 DIAGNOSIS — Z3A.34 34 WEEKS GESTATION OF PREGNANCY: ICD-10-CM

## 2021-08-09 DIAGNOSIS — O99.283 METHYLENETETRAHYDROFOLATE REDUCTASE DEFICIENCY AFFECTING PREGNANCY IN THIRD TRIMESTER (HCC): ICD-10-CM

## 2021-08-09 DIAGNOSIS — O99.213 OBESITY AFFECTING PREGNANCY IN THIRD TRIMESTER: ICD-10-CM

## 2021-08-09 DIAGNOSIS — O35.9XX0 FETAL ABNORMALITY AFFECTING MANAGEMENT OF MOTHER, SINGLE OR UNSPECIFIED FETUS: ICD-10-CM

## 2021-08-09 DIAGNOSIS — O43.113 CIRCUMVALLATE PLACENTA IN THIRD TRIMESTER: ICD-10-CM

## 2021-08-09 DIAGNOSIS — E72.12 METHYLENETETRAHYDROFOLATE REDUCTASE DEFICIENCY AFFECTING PREGNANCY IN THIRD TRIMESTER (HCC): ICD-10-CM

## 2021-08-09 DIAGNOSIS — Z82.49 FAMILY HISTORY OF THROMBOEMBOLIC DISEASE: ICD-10-CM

## 2021-08-09 PROCEDURE — 76818 FETAL BIOPHYS PROFILE W/NST: CPT | Performed by: OBSTETRICS & GYNECOLOGY

## 2021-08-09 PROCEDURE — 76820 UMBILICAL ARTERY ECHO: CPT | Performed by: OBSTETRICS & GYNECOLOGY

## 2021-08-09 PROCEDURE — 76815 OB US LIMITED FETUS(S): CPT | Performed by: OBSTETRICS & GYNECOLOGY

## 2021-08-09 NOTE — TELEPHONE ENCOUNTER
Dr. Morales Mora reviewed lab results and ordered pt to begin Foltx daily. Pt notified and agreed. Pt asked to have called into [facility] [place]. Writer called into above pharmacy, Foltx 1 po daily #90 with no refills. Unable to talk to pharmacist d/t being at lunch.

## 2021-08-11 ENCOUNTER — ROUTINE PRENATAL (OUTPATIENT)
Dept: OBGYN CLINIC | Age: 26
End: 2021-08-11
Payer: OTHER GOVERNMENT

## 2021-08-11 VITALS — WEIGHT: 221 LBS | SYSTOLIC BLOOD PRESSURE: 128 MMHG | DIASTOLIC BLOOD PRESSURE: 72 MMHG | BODY MASS INDEX: 40.42 KG/M2

## 2021-08-11 DIAGNOSIS — O43.112 CIRCUMVALLATE PLACENTA IN SECOND TRIMESTER: ICD-10-CM

## 2021-08-11 DIAGNOSIS — Z34.93 NORMAL PREGNANCY IN THIRD TRIMESTER: Primary | ICD-10-CM

## 2021-08-11 DIAGNOSIS — Z3A.34 34 WEEKS GESTATION OF PREGNANCY: ICD-10-CM

## 2021-08-11 PROCEDURE — 0502F SUBSEQUENT PRENATAL CARE: CPT | Performed by: NURSE PRACTITIONER

## 2021-08-11 PROCEDURE — 90715 TDAP VACCINE 7 YRS/> IM: CPT | Performed by: NURSE PRACTITIONER

## 2021-08-11 PROCEDURE — 90471 IMMUNIZATION ADMIN: CPT | Performed by: NURSE PRACTITIONER

## 2021-08-11 NOTE — PROGRESS NOTES
Presents for OB visit  Gestation 34w2d  Estimated Date of Delivery: 21    [facility] INSURANCE     History of SAB x1   NIPs- Negative, knows gender- Boy  PFT recommend during pregnancy and postpartum  Covid + in December  : Adin Jauregiu  Persistent right umbilical vein - fetal echocardiogram scheduled. OB History    Para Term  AB Living   3 1 1   1 1   SAB TAB Ectopic Molar Multiple Live Births   1                # Outcome Date GA Lbr Keith/2nd Weight Sex Delivery Anes PTL Lv   3 Current            2 Term 2019     Vag-Spont      1 SAB                     No Known Allergies  Current Outpatient Medications   Medication Sig Dispense Refill    promethazine (PHENERGAN) 25 MG tablet Take 1 tablet by mouth every 6 hours as needed for Nausea (Patient not taking: Reported on 2021) 30 tablet 1    senna-docusate (PERICOLACE) 8.6-50 MG per tablet Take 2 tablets by mouth daily as needed for Constipation (Patient not taking: Reported on 2021) 60 tablet 1    Probiotic Product (PROBIOTIC-10 PO) Take by mouth (Patient not taking: Reported on 2021)      Cholecalciferol (VITAMIN D3) 50 MCG (2000 UT) CAPS Take by mouth      vitamin B-12 (CYANOCOBALAMIN) 100 MCG tablet Take 50 mcg by mouth daily      Prenatal Multivit-Min-Fe-FA (PRE-LAITH PO) Take 1 tablet by mouth daily       No current facility-administered medications for this visit. No past medical history on file. Past Surgical History:   Procedure Laterality Date    WISDOM TOOTH EXTRACTION       Headaches: no  Swelling: no  Bleeding: no  Discharge: no   Dysuria: no  Nausea: no  Heartburn: no  Epigastric pain: no  Contractions: no  Leakage of fluid: no  + fetal movement       Return 2 WEEKS, continue  testing with MFM    Labs as indicated n/a    PTL signs reviewed, if indicated  Kick Count Instructions given if indicated:   The patient was instructed on fetal kick counts and was given a kick sheet to complete every 8 hours. This is to begin at 28 weeks gestation. She was instructed that the baby should move at a minimum of ten times within one hour after a meal. The patient was instructed to lay down on her left side twenty minutes after eating and count movements for up to one hour with a target value of ten movements. She was instructed to notify the office if she did not make that target after two attempts or if after any attempt there was less than four movements. After hour numbers reviewed , along with labor signs if indicated. Contractions/cramping between 5-7 minutes and persisting even with attempts of increased water and laying on side. Fluid leakage, bleeding  60}    The patient was counseled on the mandatory call ahead policy. She has been instructed to call the office at anytime prior to going into the hospital so the on-call physician may direct her to the appropriate facility for care. Exceptions were reviewed including but not limited to: Decreased fetal movement, vaginal Bleeding or hemorrhage, trauma, readily expectant delivery, or any instance where she feels 911 should be utilized. Of the 20 minute duration appointment visit, Emmett Mullen CNP spent at least 50% of the face-to-face time in counseling, explanation of diagnosis, planning of further management, and answering all questions.

## 2021-08-11 NOTE — PATIENT INSTRUCTIONS

## 2021-08-16 ENCOUNTER — ROUTINE PRENATAL (OUTPATIENT)
Dept: PERINATAL CARE | Age: 26
End: 2021-08-16
Payer: OTHER GOVERNMENT

## 2021-08-16 VITALS
BODY MASS INDEX: 41.22 KG/M2 | WEIGHT: 224 LBS | HEIGHT: 62 IN | RESPIRATION RATE: 16 BRPM | TEMPERATURE: 96.9 F | HEART RATE: 105 BPM | DIASTOLIC BLOOD PRESSURE: 75 MMHG | SYSTOLIC BLOOD PRESSURE: 134 MMHG

## 2021-08-16 DIAGNOSIS — O43.113 CIRCUMVALLATE PLACENTA IN THIRD TRIMESTER: ICD-10-CM

## 2021-08-16 DIAGNOSIS — O99.283 METHYLENETETRAHYDROFOLATE REDUCTASE DEFICIENCY AFFECTING PREGNANCY IN THIRD TRIMESTER (HCC): ICD-10-CM

## 2021-08-16 DIAGNOSIS — O35.9XX0 FETAL ABNORMALITY AFFECTING MANAGEMENT OF MOTHER, SINGLE OR UNSPECIFIED FETUS: ICD-10-CM

## 2021-08-16 DIAGNOSIS — Z82.49 FAMILY HISTORY OF THROMBOEMBOLIC DISEASE: ICD-10-CM

## 2021-08-16 DIAGNOSIS — O99.213 OBESITY AFFECTING PREGNANCY IN THIRD TRIMESTER: ICD-10-CM

## 2021-08-16 DIAGNOSIS — Z3A.35 35 WEEKS GESTATION OF PREGNANCY: ICD-10-CM

## 2021-08-16 DIAGNOSIS — E72.12 METHYLENETETRAHYDROFOLATE REDUCTASE DEFICIENCY AFFECTING PREGNANCY IN THIRD TRIMESTER (HCC): ICD-10-CM

## 2021-08-16 PROCEDURE — 76820 UMBILICAL ARTERY ECHO: CPT | Performed by: OBSTETRICS & GYNECOLOGY

## 2021-08-16 PROCEDURE — 76815 OB US LIMITED FETUS(S): CPT | Performed by: OBSTETRICS & GYNECOLOGY

## 2021-08-16 PROCEDURE — 76818 FETAL BIOPHYS PROFILE W/NST: CPT | Performed by: OBSTETRICS & GYNECOLOGY

## 2021-08-16 RX ORDER — CYANOCOBALAMIN/FOLIC AC/VIT B6 1-2.2-25MG
TABLET ORAL DAILY
COMMUNITY
Start: 2021-08-11

## 2021-08-23 ENCOUNTER — ROUTINE PRENATAL (OUTPATIENT)
Dept: PERINATAL CARE | Age: 26
End: 2021-08-23
Payer: OTHER GOVERNMENT

## 2021-08-23 VITALS
RESPIRATION RATE: 16 BRPM | SYSTOLIC BLOOD PRESSURE: 134 MMHG | WEIGHT: 225 LBS | BODY MASS INDEX: 41.15 KG/M2 | TEMPERATURE: 97 F | HEART RATE: 104 BPM | DIASTOLIC BLOOD PRESSURE: 84 MMHG

## 2021-08-23 DIAGNOSIS — E72.12 METHYLENETETRAHYDROFOLATE REDUCTASE DEFICIENCY AFFECTING PREGNANCY IN THIRD TRIMESTER (HCC): ICD-10-CM

## 2021-08-23 DIAGNOSIS — O43.113 CIRCUMVALLATE PLACENTA IN THIRD TRIMESTER: ICD-10-CM

## 2021-08-23 DIAGNOSIS — O99.213 OBESITY AFFECTING PREGNANCY IN THIRD TRIMESTER: ICD-10-CM

## 2021-08-23 DIAGNOSIS — Z36.4 ANTENATAL SCREENING FOR FETAL GROWTH RETARDATION USING ULTRASONICS: ICD-10-CM

## 2021-08-23 DIAGNOSIS — Z82.49 FAMILY HISTORY OF THROMBOEMBOLIC DISEASE: ICD-10-CM

## 2021-08-23 DIAGNOSIS — O99.283 METHYLENETETRAHYDROFOLATE REDUCTASE DEFICIENCY AFFECTING PREGNANCY IN THIRD TRIMESTER (HCC): ICD-10-CM

## 2021-08-23 DIAGNOSIS — O35.9XX0 FETAL ABNORMALITY AFFECTING MANAGEMENT OF MOTHER, SINGLE OR UNSPECIFIED FETUS: ICD-10-CM

## 2021-08-23 DIAGNOSIS — Z3A.36 36 WEEKS GESTATION OF PREGNANCY: ICD-10-CM

## 2021-08-23 PROCEDURE — 76820 UMBILICAL ARTERY ECHO: CPT | Performed by: OBSTETRICS & GYNECOLOGY

## 2021-08-23 PROCEDURE — 76816 OB US FOLLOW-UP PER FETUS: CPT | Performed by: OBSTETRICS & GYNECOLOGY

## 2021-08-23 PROCEDURE — 76818 FETAL BIOPHYS PROFILE W/NST: CPT | Performed by: OBSTETRICS & GYNECOLOGY

## 2021-08-30 ENCOUNTER — HOSPITAL ENCOUNTER (OUTPATIENT)
Age: 26
Setting detail: SPECIMEN
Discharge: HOME | End: 2021-08-30
Payer: OTHER GOVERNMENT

## 2021-08-30 ENCOUNTER — ROUTINE PRENATAL (OUTPATIENT)
Dept: OBGYN CLINIC | Age: 26
End: 2021-08-30

## 2021-08-30 ENCOUNTER — ROUTINE PRENATAL (OUTPATIENT)
Dept: PERINATAL CARE | Age: 26
End: 2021-08-30
Payer: OTHER GOVERNMENT

## 2021-08-30 VITALS
SYSTOLIC BLOOD PRESSURE: 127 MMHG | BODY MASS INDEX: 42.33 KG/M2 | WEIGHT: 230 LBS | HEART RATE: 100 BPM | RESPIRATION RATE: 16 BRPM | TEMPERATURE: 97 F | HEIGHT: 62 IN | DIASTOLIC BLOOD PRESSURE: 87 MMHG

## 2021-08-30 VITALS — SYSTOLIC BLOOD PRESSURE: 126 MMHG | BODY MASS INDEX: 41.79 KG/M2 | DIASTOLIC BLOOD PRESSURE: 70 MMHG | WEIGHT: 228.5 LBS

## 2021-08-30 DIAGNOSIS — O99.213 OBESITY AFFECTING PREGNANCY IN THIRD TRIMESTER: ICD-10-CM

## 2021-08-30 DIAGNOSIS — Z3A.37 37 WEEKS GESTATION OF PREGNANCY: ICD-10-CM

## 2021-08-30 DIAGNOSIS — O99.283 METHYLENETETRAHYDROFOLATE REDUCTASE DEFICIENCY AFFECTING PREGNANCY IN THIRD TRIMESTER (HCC): ICD-10-CM

## 2021-08-30 DIAGNOSIS — Z82.49 FAMILY HISTORY OF THROMBOEMBOLIC DISEASE: ICD-10-CM

## 2021-08-30 DIAGNOSIS — E72.12 METHYLENETETRAHYDROFOLATE REDUCTASE DEFICIENCY AFFECTING PREGNANCY IN THIRD TRIMESTER (HCC): ICD-10-CM

## 2021-08-30 DIAGNOSIS — O43.113 CIRCUMVALLATE PLACENTA IN THIRD TRIMESTER: ICD-10-CM

## 2021-08-30 DIAGNOSIS — O35.9XX0 FETAL ABNORMALITY AFFECTING MANAGEMENT OF MOTHER, SINGLE OR UNSPECIFIED FETUS: ICD-10-CM

## 2021-08-30 DIAGNOSIS — Z34.93 NORMAL PREGNANCY IN THIRD TRIMESTER: Primary | ICD-10-CM

## 2021-08-30 DIAGNOSIS — Z34.93 NORMAL PREGNANCY IN THIRD TRIMESTER: ICD-10-CM

## 2021-08-30 PROBLEM — Z86.16 HISTORY OF COVID-19: Status: ACTIVE | Noted: 2021-08-30

## 2021-08-30 PROBLEM — Z15.89 HETEROZYGOUS FOR MTHFR GENE MUTATION: Status: ACTIVE | Noted: 2021-08-30

## 2021-08-30 PROBLEM — O28.3 ABNORMAL FETAL ULTRASOUND: Status: ACTIVE | Noted: 2021-08-30

## 2021-08-30 PROBLEM — Z87.59 HISTORY OF MISCARRIAGE: Status: ACTIVE | Noted: 2021-08-30

## 2021-08-30 PROCEDURE — 76820 UMBILICAL ARTERY ECHO: CPT | Performed by: OBSTETRICS & GYNECOLOGY

## 2021-08-30 PROCEDURE — 76815 OB US LIMITED FETUS(S): CPT | Performed by: OBSTETRICS & GYNECOLOGY

## 2021-08-30 PROCEDURE — 76818 FETAL BIOPHYS PROFILE W/NST: CPT | Performed by: OBSTETRICS & GYNECOLOGY

## 2021-08-30 PROCEDURE — 0502F SUBSEQUENT PRENATAL CARE: CPT | Performed by: NURSE PRACTITIONER

## 2021-08-30 NOTE — PATIENT INSTRUCTIONS

## 2021-08-30 NOTE — PROGRESS NOTES
Presents for OB visit  Gestation 37w0d  Estimated Date of Delivery: 21    [facility] INSURANCE     History of SAB x1   NIPs- Negative, knows gender- Boy  PFT recommend during pregnancy and postpartum  Covid + in December  : Freada Filter  Persistent right umbilical vein - fetal echocardiogram scheduled. TDAP given -                    OB History    Para Term  AB Living   3 1 1   1 1   SAB TAB Ectopic Molar Multiple Live Births   1                # Outcome Date GA Lbr Keith/2nd Weight Sex Delivery Anes PTL Lv   3 Current            2 Term 2019     Vag-Spont      1 SAB                     No Known Allergies  Current Outpatient Medications   Medication Sig Dispense Refill    FABB 2.2-25-1 MG TABS tablet daily      promethazine (PHENERGAN) 25 MG tablet Take 1 tablet by mouth every 6 hours as needed for Nausea (Patient not taking: Reported on 2021) 30 tablet 1    senna-docusate (PERICOLACE) 8.6-50 MG per tablet Take 2 tablets by mouth daily as needed for Constipation (Patient not taking: Reported on 2021) 60 tablet 1    Probiotic Product (PROBIOTIC-10 PO) Take by mouth (Patient not taking: Reported on 2021)      Cholecalciferol (VITAMIN D3) 50 MCG (2000 UT) CAPS Take by mouth      vitamin B-12 (CYANOCOBALAMIN) 100 MCG tablet Take 50 mcg by mouth daily      Prenatal Multivit-Min-Fe-FA (PRE-LAITH PO) Take 1 tablet by mouth daily       No current facility-administered medications for this visit. No past medical history on file. Past Surgical History:   Procedure Laterality Date    WISDOM TOOTH EXTRACTION       Headaches: states intermittent headaches past weekend,  Denies severe or persistent  headaches, vision changes, cp, sob, midepigastric pain or increased leg swelling. BP- 126/70, ne proteinuria. Monitor severe or persistent  headaches, vision changes, cp, sob, midepigastric pain or swelling notify us immediately.      Bleeding: no  Discharge: no Dysuria: no  Nausea: no  Heartburn: no  Epigastric pain: no  Contractions: no  Leakage of fluid: no  + fetal movement       Return [place] as indicated GBS    PTL signs reviewed, if indicated  Kick Count Instructions given if indicated: The patient was instructed on fetal kick counts and was given a kick sheet to complete every 8 hours. This is to begin at 28 weeks gestation. She was instructed that the baby should move at a minimum of ten times within one hour after a meal. The patient was instructed to lay down on her left side twenty minutes after eating and count movements for up to one hour with a target value of ten movements. She was instructed to notify the office if she did not make that target after two attempts or if after any attempt there was less than four movements. After hour numbers reviewed , along with labor signs if indicated. Contractions/cramping between 5-7 minutes and persisting even with attempts of increased water and laying on side. Fluid leakage, bleeding    The patient was counseled on Labor & Delivery. Route of delivery and counseling on vaginal, operative vaginal, and  sections were completed with the risks of each to both the patient as well as her baby. The possibility of a blood transfusion was discussed as well. The patient was not opposed to receiving a transfusion if needed. The patient was counseled on types of analgesia during labor and is considering either Regional or IV medication the risks, benefits and alternatives were discussed. The patient was counseled on the mandatory call ahead policy. She has been instructed to call the office at anytime prior to going into the hospital so the on-call physician may direct her to the appropriate facility for care.  Exceptions were reviewed including but not limited to: Decreased fetal movement, vaginal Bleeding or hemorrhage, trauma, readily expectant delivery, or any instance where she feels 911 should be

## 2021-09-02 LAB
CULTURE: NORMAL
Lab: NORMAL
SPECIMEN DESCRIPTION: NORMAL

## 2021-09-08 ENCOUNTER — ROUTINE PRENATAL (OUTPATIENT)
Dept: PERINATAL CARE | Age: 26
End: 2021-09-08
Payer: OTHER GOVERNMENT

## 2021-09-08 VITALS
RESPIRATION RATE: 16 BRPM | SYSTOLIC BLOOD PRESSURE: 133 MMHG | HEART RATE: 99 BPM | BODY MASS INDEX: 42.51 KG/M2 | HEIGHT: 62 IN | DIASTOLIC BLOOD PRESSURE: 78 MMHG | TEMPERATURE: 97 F | WEIGHT: 231 LBS

## 2021-09-08 DIAGNOSIS — Z82.49 FAMILY HISTORY OF THROMBOEMBOLIC DISEASE: ICD-10-CM

## 2021-09-08 DIAGNOSIS — O99.283 METHYLENETETRAHYDROFOLATE REDUCTASE DEFICIENCY AFFECTING PREGNANCY IN THIRD TRIMESTER (HCC): ICD-10-CM

## 2021-09-08 DIAGNOSIS — O35.9XX0 FETAL ABNORMALITY AFFECTING MANAGEMENT OF MOTHER, SINGLE OR UNSPECIFIED FETUS: ICD-10-CM

## 2021-09-08 DIAGNOSIS — O43.113 CIRCUMVALLATE PLACENTA IN THIRD TRIMESTER: ICD-10-CM

## 2021-09-08 DIAGNOSIS — O99.213 OBESITY AFFECTING PREGNANCY IN THIRD TRIMESTER: ICD-10-CM

## 2021-09-08 DIAGNOSIS — E72.12 METHYLENETETRAHYDROFOLATE REDUCTASE DEFICIENCY AFFECTING PREGNANCY IN THIRD TRIMESTER (HCC): ICD-10-CM

## 2021-09-08 DIAGNOSIS — Z3A.38 38 WEEKS GESTATION OF PREGNANCY: ICD-10-CM

## 2021-09-08 PROCEDURE — 76820 UMBILICAL ARTERY ECHO: CPT | Performed by: OBSTETRICS & GYNECOLOGY

## 2021-09-08 PROCEDURE — 76815 OB US LIMITED FETUS(S): CPT | Performed by: OBSTETRICS & GYNECOLOGY

## 2021-09-08 PROCEDURE — 76818 FETAL BIOPHYS PROFILE W/NST: CPT | Performed by: OBSTETRICS & GYNECOLOGY

## 2021-09-09 ENCOUNTER — ROUTINE PRENATAL (OUTPATIENT)
Dept: OBGYN CLINIC | Age: 26
End: 2021-09-09

## 2021-09-09 VITALS — BODY MASS INDEX: 41.88 KG/M2 | SYSTOLIC BLOOD PRESSURE: 118 MMHG | WEIGHT: 229 LBS | DIASTOLIC BLOOD PRESSURE: 70 MMHG

## 2021-09-09 DIAGNOSIS — Z34.93 NORMAL PREGNANCY IN THIRD TRIMESTER: Primary | ICD-10-CM

## 2021-09-09 DIAGNOSIS — Z3A.38 38 WEEKS GESTATION OF PREGNANCY: ICD-10-CM

## 2021-09-09 PROCEDURE — 0502F SUBSEQUENT PRENATAL CARE: CPT | Performed by: NURSE PRACTITIONER

## 2021-09-09 NOTE — PATIENT INSTRUCTIONS

## 2021-09-09 NOTE — PROGRESS NOTES
Presents for OB visit  Gestation 38w3d  Estimated Date of Delivery: 21    [facility] INSURANCE     History of SAB x1   NIPs- Negative, knows gender- Boy  PFT recommend during pregnancy and postpartum  Covid + in December  : Makeda Doyle  Persistent right umbilical vein - fetal echocardiogram scheduled. TDAP given -                  OB History    Para Term  AB Living   3 1 1   1 1   SAB TAB Ectopic Molar Multiple Live Births   1                # Outcome Date GA Lbr Keith/2nd Weight Sex Delivery Anes PTL Lv   3 Current            2 Term 2019     Vag-Spont      1 SAB                     No Known Allergies  Current Outpatient Medications   Medication Sig Dispense Refill    FABB 2.2-25-1 MG TABS tablet daily      promethazine (PHENERGAN) 25 MG tablet Take 1 tablet by mouth every 6 hours as needed for Nausea (Patient not taking: Reported on 2021) 30 tablet 1    senna-docusate (PERICOLACE) 8.6-50 MG per tablet Take 2 tablets by mouth daily as needed for Constipation (Patient not taking: Reported on 2021) 60 tablet 1    Probiotic Product (PROBIOTIC-10 PO) Take by mouth (Patient not taking: Reported on 2021)      Cholecalciferol (VITAMIN D3) 50 MCG (2000 UT) CAPS Take by mouth      vitamin B-12 (CYANOCOBALAMIN) 100 MCG tablet Take 50 mcg by mouth daily      Prenatal Multivit-Min-Fe-FA (PRE- PO) Take 1 tablet by mouth daily       No current facility-administered medications for this visit. No past medical history on file. Past Surgical History:   Procedure Laterality Date    WISDOM TOOTH EXTRACTION       Headaches: no  Swelling: no  Bleeding: no  Discharge: no   Dysuria: no  Nausea: no  Heartburn: no  Epigastric pain: no  Contractions: no  Leakage of fluid: no  + fetal movement       Return postpartum, she is requesting eleactive IOL after 39weeks, discussed risks associated IOL, agreeable. She is  planning . Will be set up.      Labs as indicated n/a    PTL signs reviewed, if indicated  Kick Count Instructions given if indicated: The patient was instructed on fetal kick counts and was given a kick sheet to complete every 8 hours. This is to begin at 28 weeks gestation. She was instructed that the baby should move at a minimum of ten times within one hour after a meal. The patient was instructed to lay down on her left side twenty minutes after eating and count movements for up to one hour with a target value of ten movements. She was instructed to notify the office if she did not make that target after two attempts or if after any attempt there was less than four movements. After hour numbers reviewed , along with labor signs if indicated. Contractions/cramping between 5-7 minutes and persisting even with attempts of increased water and laying on side. Fluid leakage, bleeding    The patient was counseled on Labor & Delivery. Route of delivery and counseling on vaginal, operative vaginal, and  sections were completed with the risks of each to both the patient as well as her baby. The possibility of a blood transfusion was discussed as well. The patient was not opposed to receiving a transfusion if needed. The patient was counseled on types of analgesia during labor and is considering either Regional or IV medication the risks, benefits and alternatives were discussed. The patient was counseled on the mandatory call ahead policy. She has been instructed to call the office at anytime prior to going into the hospital so the on-call physician may direct her to the appropriate facility for care. Exceptions were reviewed including but not limited to: Decreased fetal movement, vaginal Bleeding or hemorrhage, trauma, readily expectant delivery, or any instance where she feels 911 should be utilized.       Of the 20 minute duration appointment visit, Jodie Ferrer CNP spent at least 50% of the face-to-face time in counseling,

## 2021-09-14 ENCOUNTER — HOSPITAL ENCOUNTER (INPATIENT)
Age: 26
LOS: 2 days | Discharge: HOME | End: 2021-09-16
Attending: OBSTETRICS & GYNECOLOGY | Admitting: OBSTETRICS & GYNECOLOGY
Payer: OTHER GOVERNMENT

## 2021-09-14 ENCOUNTER — ANESTHESIA (OUTPATIENT)
Dept: LABOR AND DELIVERY | Age: 26
End: 2021-09-14
Payer: OTHER GOVERNMENT

## 2021-09-14 ENCOUNTER — ANESTHESIA EVENT (OUTPATIENT)
Dept: LABOR AND DELIVERY | Age: 26
End: 2021-09-14
Payer: OTHER GOVERNMENT

## 2021-09-14 ENCOUNTER — APPOINTMENT (OUTPATIENT)
Dept: LABOR AND DELIVERY | Age: 26
End: 2021-09-14
Payer: OTHER GOVERNMENT

## 2021-09-14 DIAGNOSIS — O99.611 CONSTIPATION DURING PREGNANCY IN FIRST TRIMESTER: ICD-10-CM

## 2021-09-14 DIAGNOSIS — K59.00 CONSTIPATION DURING PREGNANCY IN FIRST TRIMESTER: ICD-10-CM

## 2021-09-14 PROBLEM — Z3A.39 39 WEEKS GESTATION OF PREGNANCY: Status: ACTIVE | Noted: 2021-09-14

## 2021-09-14 PROBLEM — O43.119 CIRCUMVALLATE PLACENTA: Status: ACTIVE | Noted: 2021-09-14

## 2021-09-14 LAB
ABO/RH: NORMAL
ABSOLUTE EOS #: 0.07 K/UL (ref 0–0.44)
ABSOLUTE IMMATURE GRANULOCYTE: 0.08 K/UL (ref 0–0.3)
ABSOLUTE LYMPH #: 1.46 K/UL (ref 1.1–3.7)
ABSOLUTE MONO #: 0.45 K/UL (ref 0.1–1.2)
AMPHETAMINE SCREEN URINE: NEGATIVE
ANTIBODY SCREEN: NEGATIVE
ARM BAND NUMBER: NORMAL
BARBITURATE SCREEN URINE: NEGATIVE
BASOPHILS # BLD: 0 % (ref 0–2)
BASOPHILS ABSOLUTE: 0.03 K/UL (ref 0–0.2)
BENZODIAZEPINE SCREEN, URINE: NEGATIVE
BUPRENORPHINE URINE: NORMAL
CANNABINOID SCREEN URINE: NEGATIVE
COCAINE METABOLITE, URINE: NEGATIVE
DIFFERENTIAL TYPE: ABNORMAL
EOSINOPHILS RELATIVE PERCENT: 1 % (ref 1–4)
EXPIRATION DATE: NORMAL
HCT VFR BLD CALC: 43.3 % (ref 36.3–47.1)
HEMOGLOBIN: 14.4 G/DL (ref 11.9–15.1)
IMMATURE GRANULOCYTES: 1 %
LYMPHOCYTES # BLD: 16 % (ref 24–43)
MCH RBC QN AUTO: 32.2 PG (ref 25.2–33.5)
MCHC RBC AUTO-ENTMCNC: 33.3 G/DL (ref 28.4–34.8)
MCV RBC AUTO: 96.9 FL (ref 82.6–102.9)
MDMA URINE: NORMAL
METHADONE SCREEN, URINE: NEGATIVE
METHAMPHETAMINE, URINE: NORMAL
MONOCYTES # BLD: 5 % (ref 3–12)
NRBC AUTOMATED: 0 PER 100 WBC
OPIATES, URINE: NEGATIVE
OXYCODONE SCREEN URINE: NEGATIVE
PDW BLD-RTO: 13.5 % (ref 11.8–14.4)
PHENCYCLIDINE, URINE: NEGATIVE
PLATELET # BLD: 124 K/UL (ref 138–453)
PLATELET ESTIMATE: ABNORMAL
PMV BLD AUTO: 12.8 FL (ref 8.1–13.5)
PROPOXYPHENE, URINE: NORMAL
RBC # BLD: 4.47 M/UL (ref 3.95–5.11)
RBC # BLD: ABNORMAL 10*6/UL
SARS-COV-2, RAPID: NOT DETECTED
SEG NEUTROPHILS: 77 % (ref 36–65)
SEGMENTED NEUTROPHILS ABSOLUTE COUNT: 6.98 K/UL (ref 1.5–8.1)
SPECIMEN DESCRIPTION: NORMAL
T. PALLIDUM, IGG: NONREACTIVE
TEST INFORMATION: NORMAL
TRICYCLIC ANTIDEPRESSANTS, UR: NORMAL
WBC # BLD: 9.1 K/UL (ref 3.5–11.3)
WBC # BLD: ABNORMAL 10*3/UL

## 2021-09-14 PROCEDURE — 87635 SARS-COV-2 COVID-19 AMP PRB: CPT

## 2021-09-14 PROCEDURE — 10907ZC DRAINAGE OF AMNIOTIC FLUID, THERAPEUTIC FROM PRODUCTS OF CONCEPTION, VIA NATURAL OR ARTIFICIAL OPENING: ICD-10-PCS | Performed by: OBSTETRICS & GYNECOLOGY

## 2021-09-14 PROCEDURE — 2500000003 HC RX 250 WO HCPCS: Performed by: NURSE ANESTHETIST, CERTIFIED REGISTERED

## 2021-09-14 PROCEDURE — 2580000003 HC RX 258: Performed by: STUDENT IN AN ORGANIZED HEALTH CARE EDUCATION/TRAINING PROGRAM

## 2021-09-14 PROCEDURE — 6360000002 HC RX W HCPCS: Performed by: STUDENT IN AN ORGANIZED HEALTH CARE EDUCATION/TRAINING PROGRAM

## 2021-09-14 PROCEDURE — 86850 RBC ANTIBODY SCREEN: CPT

## 2021-09-14 PROCEDURE — 3700000025 EPIDURAL BLOCK: Performed by: ANESTHESIOLOGY

## 2021-09-14 PROCEDURE — 36415 COLL VENOUS BLD VENIPUNCTURE: CPT

## 2021-09-14 PROCEDURE — 1220000000 HC SEMI PRIVATE OB R&B

## 2021-09-14 PROCEDURE — 96372 THER/PROPH/DIAG INJ SC/IM: CPT

## 2021-09-14 PROCEDURE — 6370000000 HC RX 637 (ALT 250 FOR IP): Performed by: STUDENT IN AN ORGANIZED HEALTH CARE EDUCATION/TRAINING PROGRAM

## 2021-09-14 PROCEDURE — 85025 COMPLETE CBC W/AUTO DIFF WBC: CPT

## 2021-09-14 PROCEDURE — 59200 INSERT CERVICAL DILATOR: CPT

## 2021-09-14 PROCEDURE — 80307 DRUG TEST PRSMV CHEM ANLYZR: CPT

## 2021-09-14 PROCEDURE — 86901 BLOOD TYPING SEROLOGIC RH(D): CPT

## 2021-09-14 PROCEDURE — 86780 TREPONEMA PALLIDUM: CPT

## 2021-09-14 PROCEDURE — 86900 BLOOD TYPING SEROLOGIC ABO: CPT

## 2021-09-14 PROCEDURE — 3E0P7VZ INTRODUCTION OF HORMONE INTO FEMALE REPRODUCTIVE, VIA NATURAL OR ARTIFICIAL OPENING: ICD-10-PCS | Performed by: OBSTETRICS & GYNECOLOGY

## 2021-09-14 PROCEDURE — 6360000002 HC RX W HCPCS: Performed by: NURSE ANESTHETIST, CERTIFIED REGISTERED

## 2021-09-14 PROCEDURE — 6360000002 HC RX W HCPCS: Performed by: ANESTHESIOLOGY

## 2021-09-14 RX ORDER — SODIUM CHLORIDE, SODIUM LACTATE, POTASSIUM CHLORIDE, AND CALCIUM CHLORIDE .6; .31; .03; .02 G/100ML; G/100ML; G/100ML; G/100ML
1000 INJECTION, SOLUTION INTRAVENOUS PRN
Status: DISCONTINUED | OUTPATIENT
Start: 2021-09-14 | End: 2021-09-15 | Stop reason: HOSPADM

## 2021-09-14 RX ORDER — SODIUM CHLORIDE, SODIUM LACTATE, POTASSIUM CHLORIDE, CALCIUM CHLORIDE 600; 310; 30; 20 MG/100ML; MG/100ML; MG/100ML; MG/100ML
INJECTION, SOLUTION INTRAVENOUS CONTINUOUS
Status: DISCONTINUED | OUTPATIENT
Start: 2021-09-14 | End: 2021-09-15

## 2021-09-14 RX ORDER — ROPIVACAINE HYDROCHLORIDE 2 MG/ML
INJECTION, SOLUTION EPIDURAL; INFILTRATION; PERINEURAL PRN
Status: DISCONTINUED | OUTPATIENT
Start: 2021-09-14 | End: 2021-09-15 | Stop reason: SDUPTHER

## 2021-09-14 RX ORDER — LIDOCAINE HYDROCHLORIDE 10 MG/ML
INJECTION, SOLUTION EPIDURAL; INFILTRATION; INTRACAUDAL; PERINEURAL PRN
Status: DISCONTINUED | OUTPATIENT
Start: 2021-09-14 | End: 2021-09-15 | Stop reason: SDUPTHER

## 2021-09-14 RX ORDER — SODIUM CHLORIDE 9 MG/ML
25 INJECTION, SOLUTION INTRAVENOUS PRN
Status: DISCONTINUED | OUTPATIENT
Start: 2021-09-14 | End: 2021-09-15 | Stop reason: HOSPADM

## 2021-09-14 RX ORDER — ACETAMINOPHEN 500 MG
1000 TABLET ORAL EVERY 6 HOURS PRN
Status: DISCONTINUED | OUTPATIENT
Start: 2021-09-14 | End: 2021-09-15 | Stop reason: HOSPADM

## 2021-09-14 RX ORDER — MORPHINE SULFATE 10 MG/ML
10 INJECTION, SOLUTION INTRAMUSCULAR; INTRAVENOUS ONCE
Status: COMPLETED | OUTPATIENT
Start: 2021-09-14 | End: 2021-09-14

## 2021-09-14 RX ORDER — LIDOCAINE HYDROCHLORIDE AND EPINEPHRINE 15; 5 MG/ML; UG/ML
INJECTION, SOLUTION EPIDURAL PRN
Status: DISCONTINUED | OUTPATIENT
Start: 2021-09-14 | End: 2021-09-15 | Stop reason: SDUPTHER

## 2021-09-14 RX ORDER — NALBUPHINE HCL 10 MG/ML
5 AMPUL (ML) INJECTION EVERY 4 HOURS PRN
Status: DISCONTINUED | OUTPATIENT
Start: 2021-09-14 | End: 2021-09-15 | Stop reason: HOSPADM

## 2021-09-14 RX ORDER — SODIUM CHLORIDE 0.9 % (FLUSH) 0.9 %
5-40 SYRINGE (ML) INJECTION EVERY 12 HOURS SCHEDULED
Status: DISCONTINUED | OUTPATIENT
Start: 2021-09-14 | End: 2021-09-15 | Stop reason: HOSPADM

## 2021-09-14 RX ORDER — SODIUM CHLORIDE, SODIUM LACTATE, POTASSIUM CHLORIDE, AND CALCIUM CHLORIDE .6; .31; .03; .02 G/100ML; G/100ML; G/100ML; G/100ML
500 INJECTION, SOLUTION INTRAVENOUS PRN
Status: DISCONTINUED | OUTPATIENT
Start: 2021-09-14 | End: 2021-09-15 | Stop reason: HOSPADM

## 2021-09-14 RX ORDER — DIPHENHYDRAMINE HCL 25 MG
25 TABLET ORAL EVERY 4 HOURS PRN
Status: DISCONTINUED | OUTPATIENT
Start: 2021-09-14 | End: 2021-09-15 | Stop reason: HOSPADM

## 2021-09-14 RX ORDER — SODIUM CHLORIDE 0.9 % (FLUSH) 0.9 %
5-40 SYRINGE (ML) INJECTION PRN
Status: DISCONTINUED | OUTPATIENT
Start: 2021-09-14 | End: 2021-09-15 | Stop reason: HOSPADM

## 2021-09-14 RX ORDER — ONDANSETRON 2 MG/ML
4 INJECTION INTRAMUSCULAR; INTRAVENOUS EVERY 6 HOURS PRN
Status: DISCONTINUED | OUTPATIENT
Start: 2021-09-14 | End: 2021-09-15 | Stop reason: HOSPADM

## 2021-09-14 RX ORDER — ONDANSETRON 2 MG/ML
4 INJECTION INTRAMUSCULAR; INTRAVENOUS EVERY 6 HOURS PRN
Status: DISCONTINUED | OUTPATIENT
Start: 2021-09-14 | End: 2021-09-14 | Stop reason: SDUPTHER

## 2021-09-14 RX ORDER — NALOXONE HYDROCHLORIDE 0.4 MG/ML
0.4 INJECTION, SOLUTION INTRAMUSCULAR; INTRAVENOUS; SUBCUTANEOUS PRN
Status: DISCONTINUED | OUTPATIENT
Start: 2021-09-14 | End: 2021-09-15 | Stop reason: HOSPADM

## 2021-09-14 RX ORDER — PROMETHAZINE HYDROCHLORIDE 25 MG/ML
25 INJECTION, SOLUTION INTRAMUSCULAR; INTRAVENOUS ONCE
Status: COMPLETED | OUTPATIENT
Start: 2021-09-14 | End: 2021-09-14

## 2021-09-14 RX ADMIN — Medication 25 MCG: at 10:48

## 2021-09-14 RX ADMIN — PROMETHAZINE HYDROCHLORIDE 25 MG: 25 INJECTION INTRAMUSCULAR; INTRAVENOUS at 15:24

## 2021-09-14 RX ADMIN — ROPIVACAINE HYDROCHLORIDE 8 ML: 2 INJECTION, SOLUTION EPIDURAL; INFILTRATION at 21:31

## 2021-09-14 RX ADMIN — Medication 1 MILLI-UNITS/MIN: at 16:00

## 2021-09-14 RX ADMIN — LIDOCAINE HYDROCHLORIDE,EPINEPHRINE BITARTRATE 5 ML: 15; .005 INJECTION, SOLUTION EPIDURAL; INFILTRATION; INTRACAUDAL; PERINEURAL at 21:24

## 2021-09-14 RX ADMIN — LIDOCAINE HYDROCHLORIDE 3 ML: 10 INJECTION, SOLUTION EPIDURAL; INFILTRATION; INTRACAUDAL; PERINEURAL at 21:21

## 2021-09-14 RX ADMIN — MORPHINE SULFATE 10 MG: 10 INJECTION INTRAVENOUS at 15:24

## 2021-09-14 RX ADMIN — SODIUM CHLORIDE, POTASSIUM CHLORIDE, SODIUM LACTATE AND CALCIUM CHLORIDE: 600; 310; 30; 20 INJECTION, SOLUTION INTRAVENOUS at 10:50

## 2021-09-14 RX ADMIN — ROPIVACAINE HYDROCHLORIDE 10 ML/HR: 2 INJECTION, SOLUTION EPIDURAL; INFILTRATION at 21:33

## 2021-09-14 ASSESSMENT — PAIN SCALES - GENERAL: PAINLEVEL_OUTOF10: 5

## 2021-09-14 NOTE — CARE COORDINATION
ANTEPARTUM NOTE    39 weeks gestation of pregnancy [Z3A.39]    Kathi Ventura was admitted to L&D on 9/14/2021 for RR IOl @ 39w1d    Will meet with patient after delivery to verify name/address/phone/insurance and discuss discharge planning.

## 2021-09-14 NOTE — H&P
OBSTETRICAL HISTORY [facility]    Date: 2021       Time: 3:13 PM   Patient Name: Delmi Bazan     Patient : 1995  Room/Bed: [facility]07-    Admission Date/Time: 2021  7:57 AM      CC:  Risk Reducing Induction of Labor      HPI: Delmi Bazan is a 31 y.o.  at 39w1d who presents for risk reducing induction of labor. The patient reports fetal movement is present, denies contractions, denies loss of fluid, denies vaginal bleeding. DATING:  LMP: Patient's last menstrual period was 2020.   Estimated Date of Delivery: 21   Based on: LMP c/w early ultrasound, at 8 2/7 weeks GA    PREGNANCY RISK FACTORS:  Patient Active Problem List   Diagnosis    Stress incontinence of urine    Anxiety    Hx SAB x1    History of COVID-19    Persistent R umbilical vein (Fetal echo wnl)    Heterozygous MTHFR     39 weeks gestation of pregnancy    Circumvallate placenta        Steroids Given In This Pregnancy:  no     REVIEW OF SYSTEMS:   Constitutional: negative fever, negative chills, negative weight changes   HEENT: negative visual disturbances, negative headaches, negative dizziness, negative hearing loss  Breast: Negative breast abnormalities, negative breast lumps, negative nipple discharge  Respiratory: negative dyspnea, negative cough, negative SOB  Cardiovascular: negative chest pain,  negative palpitations, negative arrhythmia, negative syncope   Gastrointestinal: negative abdominal pain, negative RUQ pain, negative N/V, negative diarrhea, negative constipation, negative bowel changes, negative heartburn   Genitourinary: negative dysuria, negative hematuria, negative urinary incontinence, negative vaginal discharge, negative vaginal bleeding or spotting  Dermatological: negative rash, negative pruritis, negative mole or other skin changes  Hematologic: negative bruising  Immunologic/Lymphatic: negative recent illness, negative recent sick contact  Musculoskeletal: negative back pain, negative myalgias, negative arthralgias  Neurological:  negative dizziness, negative migraines, negative seizures, negative weakness  Behavior/Psych: negative depression, negative anxiety, negative SI, negative HI    OBSTETRICAL HISTORY:   OB History    Para Term  AB Living   3 1 1 0 1 1   SAB TAB Ectopic Molar Multiple Live Births   1 0 0 0 0 0      # Outcome Date GA Lbr Keith/2nd Weight Sex Delivery Anes PTL Lv   3 Current            2 Term 2019     Vag-Spont      1 SAB                PAST MEDICAL HISTORY:   has no past medical history on file. PAST SURGICAL HISTORY:   has a past surgical history that includes [place] tooth extraction. ALLERGIES:  has No Known Allergies. MEDICATIONS:  Prior to Admission medications    Medication Sig Start Date End Date Taking? Authorizing Provider   FABB 2.2-25-1 MG TABS tablet daily 21  Yes Historical Provider, MD   Probiotic Product (PROBIOTIC-10 PO) Take by mouth    Yes Historical Provider, MD   Cholecalciferol (VITAMIN D3) 50 MCG (2000 UT) CAPS Take by mouth   Yes Historical Provider, MD   vitamin B-12 (CYANOCOBALAMIN) 100 MCG tablet Take 50 mcg by mouth daily   Yes Historical Provider, MD   Prenatal Multivit-Min-Fe-FA (PRE- PO) Take 1 tablet by mouth daily   Yes Historical Provider, MD   promethazine (PHENERGAN) 25 MG tablet Take 1 tablet by mouth every 6 hours as needed for Nausea  Patient not taking: Reported on 2021 5/10/21   STEPHANIE Shen - CNP   senna-docusate (Aure Alvarado) 8.6-50 MG per tablet Take 2 tablets by mouth daily as needed for Constipation  Patient not taking: Reported on 2021   Bernie Harris DO       FAMILY HISTORY:  family history includes Hypertension in her mother. SOCIAL HISTORY:   reports that she has never smoked. She has never used smokeless tobacco. She reports previous alcohol use.  She reports that she does not use drugs.    VITALS:  Vitals:    09/14/21 0845 09/14/21 1224   BP: 119/69 117/73   Pulse: 104 84   Resp: 20 20   Temp: 97.5 °F (36.4 °C) 98.4 °F (36.9 °C)   TempSrc: Oral    SpO2: 98%    Weight: 229 lb (103.9 kg)    Height: 5' 2\" (1.575 m)          PHYSICAL EXAM:  Fetal Heart Monitor:  Baseline Heart Rate 145, moderate variability, present accelerations, absent decelerations  [place]: contractions, rare    General appearance:  no apparent distress, alert and cooperative  HEENT: head atraumatic, normocephalic, moist mucous membranes, trachea midline  Neurologic:  alert, oriented, normal speech, no focal findings or movement disorder noted  Lungs:  No increased work of breathing, good air exchange, clear to auscultation bilaterally, no crackles or wheezing  Heart:  regular rate and rhythm and no murmur, rubs, gallops  Abdomen:  soft, gravid, non-tender, no rebound, guarding, or rigidity, no RUQ or epigastric tenderness, no signs or symptoms of abruption, no signs or symptoms of chorioamnionitis  Extremities:  no calf tenderness, non edematous, no varicosities, full range of motion in all four extremities  Musculoskeletal: Gross strength equal and intact throughout, no gross abnormalities, range of motion normal in hips, knees, shoulders and spine, CVA tenderness: none  Psychiatric: Mood appropriate, normal affect   Rectal Exam: not indicated  Pelvic Exam:   Chaperone for Intimate Exam: Chaperone was present for entire exam, Chaperone Name: Ernie Garduno RN  Cervix: No cervical motion tenderness   Uterus: Is gravid, Normal size, shape, consistency and non-tender   Adnexa: Non-tender, no palpable masses  Cervix: 2 cm dilated, 30 % effaced, -2 station, mid position (out of 3 station), firm consistency, FETAL POSITION: Cephalic (confirmed by ultrasound), Membranes intact,    Bishops Score: 5     0 1 2 3   Position Posterior Intermediate Anterior -   Consistency Firm Intermediate Soft -   Effacement 0-30% 31-50% 51-80% >80%   Dilation 0cm 1-2cm 3-4cm >5cm   Fetal Station -3 -2 -1, 0 +1, +2     DATA:     LIMITED BEDSIDE US:  Position: Cephalic  Placental Location: anterior  Fetal Heart Tones: Present  Fetal Movement: Present  Amniotic Fluid Index/Volume:  adequate 2x2 cm fluid pocket  Estimated Fetal Weight:  8 lbs 12oz    PRENATAL LAB RESULTS:   Blood Type/Rh: O pos  Antibody Screen: negative  Hemoglobin, Hematocrit, Platelets: Hgb 94.0/ITB 43.9/Plt 184  Rubella: immune  T.  Pallidum, IgG: non-reactive  Hepatitis B Surface Antigen: non-reactive   Hepatitis C Antibody: unknown   HIV: non-reactive   Sickle Cell Screen: not done  Gonorrhea: negative  Chlamydia: negative  Urine culture: negative, date: 21    1 hour Glucose Tolerance Test: 127    Group B Strep: negative RV culture on 21  Cystic Fibrosis Screen: negative  First Trimester Screen: low risk for aneuploidy  MSAFP/Multiple Markers: not done  Non-Invasive Prenatal Testing: Low risk for aneuploidy   Anatomy US: anterior placenta, 3VC with persistent fetal right umbilical vein, male gender, normal anatomy    ASSESSMENT & PLAN:  Jono Moeller is a 31 y.o. female  at 39w1d RR IOL    - GBS negative / Rh positive / R immune   - No indication for GBS prophylaxis   - ATSO Dr. Yepez   - CBC, TPall, T&S   - UDS R/B/A discussed, consent obtained and in chart   - Cat 1 FHT, TOCO occasional contractions    - SVE: /-2 (out of 3 station)   - BSUS: Cephalic, anterior placenta, Efw 8#12   - Mode of induction: Cytotec     Persistent R fetal umbilical Vein    - Noted on MFM US    - Patient follows with [facility]    - Fetal Echo wnl     FHx Thromboembolic event    - Patient's Father    Circumvallate Placenta     Anxiety    - Currently managed without medications     Hx SAB x 1    Hx COVID-19    Stress Incontinence of Urine    - Denies sx at this time     MTHFR mutation     BMI 41.8    Patient Active Problem List    Diagnosis Date Noted    39 weeks gestation of pregnancy 2021    Circumvallate placenta 09/14/2021    Hx SAB x1 08/30/2021    History of COVID-19 08/30/2021     Reported, result not found      Persistent R umbilical vein (Fetal echo wnl) 08/30/2021     Fetal echo wnl 8/3/21      Heterozygous MTHFR  08/30/2021    Stress incontinence of urine 03/10/2021    Anxiety 03/10/2021       Plan discussed with Dr. Abdoul Savage, who is agreeable. Steroids given this admission: No    Risks, benefits, alternatives and possible complications have been discussed in detail with the patient. Admission, and post admission procedures and expectations were discussed in detail. All questions were answered.     Attending's Name: Dr. Michael Molina DO  Ob/Gyn Resident  9/14/2021, 3:13 PM

## 2021-09-14 NOTE — PROGRESS NOTES
OBGYN Labor Progress Note    Jono Moeller is a 31 y.o. female  at 39w1d  The patient was seen and examined. Her pain is well controlled. She reports fetal movement is present, complains of contractions, denies loss of fluid, denies vaginal bleeding. Patient is agreeable to cervical check and Cooks catheter placement.      Vital Signs:  Vitals:    21 0845 21 1224   BP: 119/69 117/73   Pulse: 104 84   Resp: 20 20   Temp: 97.5 °F (36.4 °C) 98.4 °F (36.9 °C)   TempSrc: Oral    SpO2: 98%    Weight: 229 lb (103.9 kg)    Height: 5' 2\" (1.575 m)        FHT: 145, moderate variability, accelerations present, decelerations absent  Contractions: regular, every 6 minutes    Examination chaperoned by Reynaldo Chao RN    Cervical Exam: 2 cm dilated, 30% effaced, -2 station  Pitocin: @ 0 mu/min    Membranes: Intact  Scalp Electrode in place: absent  Intrauterine Pressure Catheter in Place: absent    Interventions: Cooks catheter placed, patient tolerated well     Assessment/Plan:  Jono Moeller is a 31 y.o. female  at 39w1d IUP   - GBS negative / Rh positive / R immune   - No indication for GBS prophylaxis   - COVID neg     RR IOL   - VSS   - cEFM and TOCO   - IVF:  mL/hr   - S/p Cytotec 25mcg PV x1   - S/p Morphine/phenergan x1   - Cooks catheter in place, remove at 0355 if not already displaced   - Will start pitocin per protocol    - Continue expectant management    Attending and senior resident updated and in agreement with plan    Celia Paget, DO  OBFIDEN Resident  2021, 3:39 PM

## 2021-09-14 NOTE — DISCHARGE SUMMARY
Obstetric Discharge Summary  [facility]    Patient Name: Kianna King  Patient : 1995  Primary Care Physician: Taylor Lopez MD  Admit Date: 2021    Principal Diagnosis: IUP at 39w1d, admitted for Risk reducing Induction of Labor    Her pregnancy has been complicated by:   Patient Active Problem List   Diagnosis    Stress incontinence of urine    Anxiety    Hx SAB x1    History of COVID-19    Persistent R umbilical vein (Fetal echo wnl)    Heterozygous MTHFR     39 weeks gestation of pregnancy    Circumvallate placenta     9/15/21 M Apg 8/9 Wt 8#4       Infection Present?: No  Hospital Acquired: No    Surgical Operations & Procedures:  Analgesia: epidural  Delivery Type: Spontaneous Vaginal Delivery: See Labor and Delivery Summary   Laceration(s): First degree laceration. Suture used for repair:  Vicryl 3.0. Consultations: Anesthesia    Pertinent Findings & Procedures:   Kianna King is a 31 y.o. female  at 39w1d admitted for risk reducing induction of labor; received cytotec 25mcg PV x1, Morphine/Phenergan x1, Cooks catheter, Pitocin, AROM, Epidural, IUPC, amnioinfusion. She delivered by spontaneous vaginal a Live Born infant on 9/15/21. Information for the patient's :  Julienne Gaming Jennifer Light [[id number]]   male   Birth Weight: 8 lb 4.1 oz (3.745 kg)       Apgars: 8 at 1 minute and 9 at 5 minutes.      Postpartum course: normal.      Course of patient: uncomplicated    Discharge to: Home    Readmission planned: no     Recommendations on Discharge:     Medications:      Medication List      START taking these medications    ibuprofen 600 MG tablet  Commonly known as: ADVIL;MOTRIN  Take 1 tablet by mouth every 6 hours as needed for Pain        CHANGE how you take these medications    * senna-docusate 8.6-50 MG per tablet  Commonly known as: PERICOLACE  Take 2 tablets by mouth daily as needed for Constipation  What changed: Another medication with the same name was added. Make sure you understand how and when to take each. * sennosides-docusate sodium 8.6-50 MG tablet  Commonly known as: SENOKOT-S  Take 1 tablet by mouth daily  What changed: You were already taking a medication with the same name, and this prescription was added. Make sure you understand how and when to take each. * This list has 2 medication(s) that are the same as other medications prescribed for you. Read the directions carefully, and ask your doctor or other care provider to review them with you. CONTINUE taking these medications    FaBB 2.2-25-1 MG Tabs tablet  Generic drug: folic acid-pyridoxine-cyanocobalamine     PRE- PO     PROBIOTIC-10 PO     promethazine 25 MG tablet  Commonly known as: PHENERGAN  Take 1 tablet by mouth every 6 hours as needed for Nausea     vitamin B-12 100 MCG tablet  Commonly known as: CYANOCOBALAMIN     Vitamin D3 50 MCG ( UT) Caps           Where to Get Your Medications      You can get these medications from any pharmacy    Bring a paper prescription for each of these medications  · ibuprofen 600 MG tablet  · sennosides-docusate sodium 8.6-50 MG tablet           Activity: pelvic rest x 6 weeks, no lifting greater than 15 lbs  Diet: regular diet  Follow up: 2 weeks    Condition on discharge: stable    Discharge date: 9/15/21    Nic Rocha DO  Ob/Gyn Resident    Comments:  Home care and follow-up care were reviewed. Pelvic rest, and birth control were reviewed. Signs and symptoms of mastitis and post partum depression were reviewed. The patient is to notify her physician if any of these occur. The patient was counseled on secondary smoke risks and the increased risk of sudden infant death syndrome and respiratory problems to her baby with exposure. She was counseled on various alternate recommendations to decrease the exposure to secondary smoke to her children. \

## 2021-09-14 NOTE — PROGRESS NOTES
AROM performed at 1940 clear fluid  SVE: 5/70/-1  FHT's: CAT I tracing 145 bpm, moderate variability, + accels, variable X 1 during rupture of membranes, no decels  TOCO: CTX q 3 minutes    Plan: continue pitocin  Epidural per patient request

## 2021-09-15 PROCEDURE — 6370000000 HC RX 637 (ALT 250 FOR IP): Performed by: STUDENT IN AN ORGANIZED HEALTH CARE EDUCATION/TRAINING PROGRAM

## 2021-09-15 PROCEDURE — 88307 TISSUE EXAM BY PATHOLOGIST: CPT

## 2021-09-15 PROCEDURE — 7200000001 HC VAGINAL DELIVERY

## 2021-09-15 PROCEDURE — 3E0E7GC INTRODUCTION OF OTHER THERAPEUTIC SUBSTANCE INTO PRODUCTS OF CONCEPTION, VIA NATURAL OR ARTIFICIAL OPENING: ICD-10-PCS | Performed by: OBSTETRICS & GYNECOLOGY

## 2021-09-15 PROCEDURE — 6360000002 HC RX W HCPCS: Performed by: STUDENT IN AN ORGANIZED HEALTH CARE EDUCATION/TRAINING PROGRAM

## 2021-09-15 PROCEDURE — 1220000000 HC SEMI PRIVATE OB R&B

## 2021-09-15 PROCEDURE — 96374 THER/PROPH/DIAG INJ IV PUSH: CPT

## 2021-09-15 PROCEDURE — 0HQ9XZZ REPAIR PERINEUM SKIN, EXTERNAL APPROACH: ICD-10-PCS | Performed by: OBSTETRICS & GYNECOLOGY

## 2021-09-15 RX ORDER — ONDANSETRON 2 MG/ML
4 INJECTION INTRAMUSCULAR; INTRAVENOUS EVERY 4 HOURS PRN
Status: DISCONTINUED | OUTPATIENT
Start: 2021-09-15 | End: 2021-09-16 | Stop reason: HOSPADM

## 2021-09-15 RX ORDER — IBUPROFEN 600 MG/1
600 TABLET ORAL EVERY 6 HOURS
Status: DISCONTINUED | OUTPATIENT
Start: 2021-09-15 | End: 2021-09-16 | Stop reason: HOSPADM

## 2021-09-15 RX ORDER — LIDOCAINE HYDROCHLORIDE 10 MG/ML
5 INJECTION, SOLUTION EPIDURAL; INFILTRATION; INTRACAUDAL; PERINEURAL PRN
Status: DISCONTINUED | OUTPATIENT
Start: 2021-09-15 | End: 2021-09-15

## 2021-09-15 RX ORDER — ACETAMINOPHEN 500 MG
1000 TABLET ORAL EVERY 6 HOURS PRN
Status: DISCONTINUED | OUTPATIENT
Start: 2021-09-15 | End: 2021-09-16 | Stop reason: HOSPADM

## 2021-09-15 RX ORDER — SIMETHICONE 80 MG
80 TABLET,CHEWABLE ORAL EVERY 6 HOURS PRN
Status: DISCONTINUED | OUTPATIENT
Start: 2021-09-15 | End: 2021-09-16 | Stop reason: HOSPADM

## 2021-09-15 RX ORDER — VITAMIN A, ASCORBIC ACID, CHOLECALCIFEROL, .ALPHA.-TOCOPHEROL ACETATE, DL-, THIAMINE MONONITRATE, RIBOFLAVIN, NIACINAMIDE, PYRIDOXINE HYDROCHLORIDE, FOLIC ACID, CYANOCOBALAMIN, CALCIUM CARBONATE, IRON, ZINC OXIDE, AND CUPRIC OXIDE 4000; 120; 400; 22; 1.84; 3; 20; 10; 1; 12; 200; 29; 25; 2 [IU]/1; MG/1; [IU]/1; [IU]/1; MG/1; MG/1; MG/1; MG/1; MG/1; UG/1; MG/1; MG/1; MG/1; MG/1
1 TABLET ORAL DAILY
Status: DISCONTINUED | OUTPATIENT
Start: 2021-09-15 | End: 2021-09-16 | Stop reason: HOSPADM

## 2021-09-15 RX ORDER — PETROLATUM, YELLOW 100 %
JELLY (GRAM) MISCELLANEOUS PRN
Status: DISCONTINUED | OUTPATIENT
Start: 2021-09-15 | End: 2021-09-15

## 2021-09-15 RX ORDER — SENNA AND DOCUSATE SODIUM 50; 8.6 MG/1; MG/1
1 TABLET, FILM COATED ORAL DAILY
Qty: 30 TABLET | Refills: 0 | Status: SHIPPED | OUTPATIENT
Start: 2021-09-15 | End: 2021-10-07 | Stop reason: ALTCHOICE

## 2021-09-15 RX ORDER — BISACODYL 10 MG
10 SUPPOSITORY, RECTAL RECTAL DAILY PRN
Status: DISCONTINUED | OUTPATIENT
Start: 2021-09-15 | End: 2021-09-16 | Stop reason: HOSPADM

## 2021-09-15 RX ORDER — DIPHENHYDRAMINE HYDROCHLORIDE 50 MG/ML
50 INJECTION INTRAMUSCULAR; INTRAVENOUS ONCE
Status: COMPLETED | OUTPATIENT
Start: 2021-09-15 | End: 2021-09-15

## 2021-09-15 RX ORDER — DOCUSATE SODIUM 100 MG/1
100 CAPSULE, LIQUID FILLED ORAL 2 TIMES DAILY
Status: DISCONTINUED | OUTPATIENT
Start: 2021-09-15 | End: 2021-09-16 | Stop reason: HOSPADM

## 2021-09-15 RX ORDER — LANOLIN 72 %
OINTMENT (GRAM) TOPICAL PRN
Status: DISCONTINUED | OUTPATIENT
Start: 2021-09-15 | End: 2021-09-16 | Stop reason: HOSPADM

## 2021-09-15 RX ORDER — IBUPROFEN 600 MG/1
600 TABLET ORAL EVERY 6 HOURS PRN
Qty: 30 TABLET | Refills: 0 | Status: SHIPPED | OUTPATIENT
Start: 2021-09-15

## 2021-09-15 RX ADMIN — IBUPROFEN 600 MG: 600 TABLET, FILM COATED ORAL at 17:05

## 2021-09-15 RX ADMIN — DIPHENHYDRAMINE HYDROCHLORIDE 50 MG: 50 INJECTION INTRAMUSCULAR; INTRAVENOUS at 00:54

## 2021-09-15 RX ADMIN — IBUPROFEN 600 MG: 600 TABLET, FILM COATED ORAL at 03:35

## 2021-09-15 RX ADMIN — ACETAMINOPHEN 1000 MG: 500 TABLET ORAL at 10:22

## 2021-09-15 RX ADMIN — WITCH HAZEL 40 EACH: 500 SOLUTION RECTAL; TOPICAL at 05:02

## 2021-09-15 RX ADMIN — IBUPROFEN 600 MG: 600 TABLET, FILM COATED ORAL at 08:54

## 2021-09-15 RX ADMIN — ACETAMINOPHEN 1000 MG: 500 TABLET ORAL at 05:02

## 2021-09-15 RX ADMIN — BENZOCAINE AND LEVOMENTHOL: 200; 5 SPRAY TOPICAL at 05:02

## 2021-09-15 RX ADMIN — IBUPROFEN 600 MG: 600 TABLET, FILM COATED ORAL at 23:09

## 2021-09-15 RX ADMIN — ACETAMINOPHEN 1000 MG: 500 TABLET ORAL at 18:49

## 2021-09-15 RX ADMIN — Medication 166.7 ML: at 02:07

## 2021-09-15 RX ADMIN — DOCUSATE SODIUM 100 MG: 100 CAPSULE ORAL at 08:54

## 2021-09-15 RX ADMIN — DOCUSATE SODIUM 100 MG: 100 CAPSULE ORAL at 20:04

## 2021-09-15 ASSESSMENT — PAIN DESCRIPTION - ORIENTATION
ORIENTATION: LOWER;MID
ORIENTATION: LOWER;MID

## 2021-09-15 ASSESSMENT — PAIN DESCRIPTION - PAIN TYPE
TYPE: ACUTE PAIN
TYPE: ACUTE PAIN

## 2021-09-15 ASSESSMENT — PAIN DESCRIPTION - FREQUENCY
FREQUENCY: INTERMITTENT
FREQUENCY: INTERMITTENT

## 2021-09-15 ASSESSMENT — PAIN DESCRIPTION - DESCRIPTORS
DESCRIPTORS: CRAMPING;DISCOMFORT
DESCRIPTORS: CRAMPING;DISCOMFORT;SORE

## 2021-09-15 ASSESSMENT — PAIN SCALES - GENERAL
PAINLEVEL_OUTOF10: 4
PAINLEVEL_OUTOF10: 3
PAINLEVEL_OUTOF10: 4
PAINLEVEL_OUTOF10: 3
PAINLEVEL_OUTOF10: 0
PAINLEVEL_OUTOF10: 3
PAINLEVEL_OUTOF10: 4

## 2021-09-15 ASSESSMENT — PAIN DESCRIPTION - LOCATION
LOCATION: PERINEUM;ABDOMEN
LOCATION: ABDOMEN;PERINEUM

## 2021-09-15 ASSESSMENT — PAIN DESCRIPTION - ONSET
ONSET: ON-GOING
ONSET: ON-GOING

## 2021-09-15 ASSESSMENT — PAIN - FUNCTIONAL ASSESSMENT: PAIN_FUNCTIONAL_ASSESSMENT: ACTIVITIES ARE NOT PREVENTED

## 2021-09-15 NOTE — ANESTHESIA PROCEDURE NOTES
Epidural Block    Patient location during procedure: OB  Reason for block: labor epidural  Staffing  Performed: resident/CRNA   Resident/CRNA: STEPHANIE Mott CRNA  Preanesthetic Checklist  Completed: patient identified, IV checked, site marked, risks and benefits discussed, surgical consent, monitors and equipment checked, pre-op evaluation, timeout performed, anesthesia consent given, oxygen available and patient being monitored  Epidural  Patient position: sitting  Prep: Betadine  Patient monitoring: continuous pulse ox and frequent blood pressure checks  Approach: midline  Location: lumbar (1-5)  Injection technique: TERRENCE saline  Provider prep: mask and sterile gloves  Needle  Needle type: Tuohy   Needle gauge: 17 G  Needle length: 3.5 in  Needle insertion depth: 6.5 cm  Catheter type: end hole  Catheter size: 19 G  Catheter at skin depth: 13 cm  Test dose: negative  Assessment  Hemodynamics: stable  Attempts: 1

## 2021-09-15 NOTE — FLOWSHEET NOTE
Nahum Found in room at 2110 to place epidural. Procedure explained and consent signed. Patient positioned. Epidural in at 2123, test dose at 2124. Secured per anesthesia. Patient to low fowlers with left uterine displacement. Pump initiated at 2133. Patient tolerated procedure well.

## 2021-09-15 NOTE — ANESTHESIA PRE PROCEDURE
Department of Anesthesiology  Preprocedure Note       Name:  Unique Blue   Age:  31 y.o.  :  1995                                          MRN:  [id number]         Date:  2021      Surgeon: * No surgeons listed *    Procedure: * No procedures listed *    Medications prior to admission:   Prior to Admission medications    Medication Sig Start Date End Date Taking?  Authorizing Provider   FABB 2.2-25-1 MG TABS tablet daily 21  Yes Historical Provider, MD   Probiotic Product (PROBIOTIC-10 PO) Take by mouth    Yes Historical Provider, MD   Cholecalciferol (VITAMIN D3) 50 MCG ( UT) CAPS Take by mouth   Yes Historical Provider, MD   vitamin B-12 (CYANOCOBALAMIN) 100 MCG tablet Take 50 mcg by mouth daily   Yes Historical Provider, MD   Prenatal Multivit-Min-Fe-FA (PRE- PO) Take 1 tablet by mouth daily   Yes Historical Provider, MD   promethazine (PHENERGAN) 25 MG tablet Take 1 tablet by mouth every 6 hours as needed for Nausea  Patient not taking: Reported on 2021 5/10/21   STEPHANIE Haque - CNP   senna-docusate (Dolphus Puneet) 8.6-50 MG per tablet Take 2 tablets by mouth daily as needed for Constipation  Patient not taking: Reported on 2021   Suad Harris DO       Current medications:    Current Facility-Administered Medications   Medication Dose Route Frequency Provider Last Rate Last Admin    lactated ringers infusion   IntraVENous Continuous Shola Claudio  mL/hr at 21 1050 New Bag at 21 1050    lactated ringers bolus  500 mL IntraVENous PRN Shola Claudio DO        Or    lactated ringers bolus  1,000 mL IntraVENous PRN Shola Claudio, DO        sodium chloride flush 0.9 % injection 5-40 mL  5-40 mL IntraVENous 2 times per day Shola Claudio, DO        sodium chloride flush 0.9 % injection 5-40 mL  5-40 mL IntraVENous PRN Shola Claudio, DO        0.9 % sodium chloride infusion  25 mL IntraVENous PRN Shola Claudio, DO        ondansetron [facility]) injection 4 mg  4 mg IntraVENous Q6H PRN Paul Amos, DO        diphenhydrAMINE (BENADRYL) tablet 25 mg  25 mg Oral Q4H PRN Paul Amos, DO        oxytocin (PITOCIN) 30 units in 500 mL infusion  87.3 jolie-units/min IntraVENous PRN Paul Amos, DO        And    oxytocin (PITOCIN) 10 unit bolus from the bag  10 Units IntraVENous PRN Paul Amos, DO        acetaminophen (TYLENOL) tablet 1,000 mg  1,000 mg Oral Q6H PRN Paul Amos, DO        oxytocin (PITOCIN) 30 units in 500 mL infusion  1-20 jolie-units/min IntraVENous Continuous Marycruz Boga, DO 4 mL/hr at 09/14/21 1830 4 jolie-units/min at 09/14/21 1830    naloxone (NARCAN) injection 0.4 mg  0.4 mg IntraVENous PRN Pamela Allen MD        nalbuphine (NUBAIN) injection 5 mg  5 mg IntraVENous Q4H PRN Pamela Allen MD        ondansetron [facility]) injection 4 mg  4 mg IntraVENous Q6H PRN Pamela Allen MD        ropivacaine 0.2% in sodium chloride 0.9% (OB) epidural 100 mL  10 mL/hr Epidural Continuous Pamela Allen MD           Allergies:  No Known Allergies    Problem List:    Patient Active Problem List   Diagnosis Code    Stress incontinence of urine N39.3    Anxiety F41.9    Hx SAB x1 Z87.59    History of COVID-19 Z86.16    Persistent R umbilical vein (Fetal echo wnl) O28.3    Heterozygous MTHFR  Z15.89    39 weeks gestation of pregnancy Z3A.39    Circumvallate placenta O43.119       Past Medical History:  History reviewed. No pertinent past medical history.     Past Surgical History:        Procedure Laterality Date    WISDOM TOOTH EXTRACTION         Social History:    Social History     Tobacco Use    Smoking status: Never Smoker    Smokeless tobacco: Never Used   Substance Use Topics    Alcohol use: Not Currently                                Counseling given: Not Answered      Vital Signs (Current):   Vitals:    09/14/21 0845 09/14/21 1224 09/14/21 1600 09/14/21 2058   BP: 119/69 117/73 129/68 (!) 139/90   Pulse: 104 84 87 93   Resp: 20 20 18    Temp: 36.4 °C (97.5 °F) 36.9 °C (98.4 °F) 36.9 °C (98.4 °F) 36.5 °C (97.7 °F)   TempSrc: Oral      SpO2: 98%      Weight: 229 lb (103.9 kg)      Height: 5' 2\" (1.575 m)                                                 BP Readings from Last 3 Encounters:   09/14/21 (!) 139/90   09/09/21 118/70   09/08/21 133/78       NPO Status:                                                                                 BMI:   Wt Readings from Last 3 Encounters:   09/14/21 229 lb (103.9 kg)   09/09/21 229 lb (103.9 kg)   09/08/21 231 lb (104.8 kg)     Body mass index is 41.88 kg/m². CBC:   Lab Results   Component Value Date    WBC 9.1 09/14/2021    RBC 4.47 09/14/2021    HGB 14.4 09/14/2021    HCT 43.3 09/14/2021    MCV 96.9 09/14/2021    RDW 13.5 09/14/2021     09/14/2021       CMP:   Lab Results   Component Value Date     12/08/2020    K 3.7 12/08/2020     12/08/2020    CO2 22 12/08/2020    BUN 8 12/08/2020    CREATININE 0.57 12/08/2020    GFRAA >60 12/08/2020    LABGLOM >60 12/08/2020    GLUCOSE 127 07/19/2021    GLUCOSE 84 12/08/2020    CALCIUM 9.1 12/08/2020       POC Tests: No results for input(s): POCGLU, POCNA, POCK, POCCL, POCBUN, POCHEMO, POCHCT in the last 72 hours.     Coags:   Lab Results   Component Value Date    PROTIME 9.8 08/02/2021    INR 0.9 08/02/2021    APTT 26.0 08/02/2021       HCG (If Applicable): No results found for: PREGTESTUR, PREGSERUM, HCG, HCGQUANT     ABGs: No results found for: PHART, PO2ART, VPI6NPK, BMU8WYB, BEART, T5OAQIEU     Type & Screen (If Applicable):  No results found for: LABABO, LABRH    Drug/Infectious Status (If Applicable):  No results found for: HIV, HEPCAB    COVID-19 Screening (If Applicable):   Lab Results   Component Value Date    COVID19 Not Detected 09/14/2021    COVID19 Not Detected 11/27/2020           Anesthesia Evaluation  Patient summary reviewed no history of anesthetic complications:   Airway: Mallampati: II  TM distance: >3 FB   Neck ROM: full  Mouth opening: > = 3 FB Dental: normal exam         Pulmonary:Negative Pulmonary ROS                              Cardiovascular:  Exercise tolerance: good (>4 METS),                     Neuro/Psych:   Negative Neuro/Psych ROS              GI/Hepatic/Renal:   (+) GERD: well controlled,           Endo/Other: Negative Endo/Other ROS                    Abdominal:             Vascular: Other Findings:             Anesthesia Plan      epidural     ASA 2             Anesthetic plan and risks discussed with patient.                       Monika Bush APRN - CRNA   9/14/2021

## 2021-09-15 NOTE — L&D DELIVERY NOTE
Intact  Disposition: Lab     Delivery Resuscitation    Method: Bulb Suction, Stimulation     Apgars    Living status: Living  Apgars   1 Minute:  5 Minute:  10 Minute 15 Minute 20 Minute   Skin Color: 0  1       Heart Rate: 2  2       Reflex Irritability: 2  2       Muscle Tone: 2  2       Respiratory Effort: 2  2       Total: 8  9               Apgars Assigned Hilary Holbrook RN     Skin to Skin    Skin to skin initiation date/time: 9/15/21 01:58:00 EDT   Skin to skin with:  Mother  Skin to skin end date/time:        [place] Measurements    Weight: 3745 g Length: 52.1 cm      Delivery Information    Episiotomy: None  Perineal lacerations: 1st Repaired: Yes   Vaginal laceration: No    Cervical laceration: No    Surgical or additional est. blood loss (mL): 0 (View Only): Edit in Flowsheets   Combined est. blood loss (mL): 0     Vaginal Delivery Counts    Initial count personnel: [facility] CST   4x4:  [place]:  Instruments:  Lap Pads:  Sponges:    Initial counts:         Final counts:         Final count personnel: DR Mateusz Warren  Accurate final count?: Yes  Final vaginal sweep completed: Yes     Other Procedures    Procedures: None            Vaginal Delivery Note  Department of Obstetrics and Gynecology  [facility]       Patient: Richard Corbett   : 1995  MRN: [id number]   Date of delivery: 9/15/21     Pre-operative Diagnosis: Richard Corbett  at 39w2d  RR IOL  Persistent right umbilical vein (fetal echo wnl)  Heterzygous MTHFR  FHx thromboembolic event  Circumvallate placenta  Anxiety  Hx SB x1  Hx COVID19  Urinary stress incontinence   BMI 41    Post-operative Diagnosis:  Living  male infant, same as above    Delivering Obstetrician & Assistant(s): Dr. Manuel Rogers; Selam Rodgers PGY3; Devan Bennett PGY1    Infant Information:   Information for the patient's :  Luciana Esparza Boy  He [[id number]]        Information for the patient's :  Prosper Cruz [[id number]]          Apgar scores: 8 at 1 minute and 9 at 5 minutes. Anesthesia:  epidural anesthesia    Application and Delivery:    She was known to be GBS negative. The patient progressed well, received an epidural, became complete and felt the urge to push. After pushing with contractions the fetal head delivered Cephalic, right occiput anterior over an intact perineum, nuchal cord was not present. The anterior, then posterior shoulder delivered easily and atraumatically followed by the rest of the infant. Nose and mouth suctioned with bulb suction, infant was stimulated and dried. Cord was clamped and cut after one minute delayed cord clamping  and infant was placed on maternal abdomen, and attended by RN for evaluation. The delivery of the placenta was spontaneous and appeared intact, whole and that the umbilical cord had three vessels noted. Pitocin was started. The vagina was swept of all clots and debris. The perineum and vagina were evaluated and a midline 1st degree perineal laceration was found and repaired in standard fashion with 3-0 vicryl. Mother and baby tolerated procedure well. Dr. Dominick Chanel was present for entire delivery.     Delivery Summary:       Specimen: placenta sent to pathology, cord blood and cord gases  Quantitative blood loss:  200ml immediately following delivery  Condition:  infant stable to general nursery and mother stable  Counts: instrument and sponge counts correct  Blood Type and Rh: O POSITIVE    Rubella Immunity Status: immune      Liudmila Zavala DO  Ob/Gyn Resident  9/15/2021, 2:37 AM

## 2021-09-15 NOTE — PROGRESS NOTES
Labor Progress Note    Jono Moeller is a 31 y.o. female  at 39w2d  The patient was seen and examined. Her pain is well controlled. She reports fetal movement is present, complains of contractions, complains of loss of fluid, denies vaginal bleeding. FHT notable for recurrent variable decelerations and potential late decelerations but unable to determine relation to contractions. Decision made to place IUPC and start amnioinfusion. Vital Signs:  Vitals:    21 2300 21 2332 09/15/21 0000 09/15/21 0031   BP: 125/80 (!) 107/53 116/69 (!) 102/51   Pulse: 105 108 85 95   Resp:       Temp: 97.7 °F (36.5 °C)      TempSrc:       SpO2: 100%  97%    Weight:       Height:             FHT: 150, moderate variability, accelerations present, decelerations variable and late present, unable to determine relation to contractions. Patient repositioned, fluid bolus given,  IUPC placed and amnioinfusion initiated  Contractions: regular, every 2-3 minutes    Chaperone for Intimate Exam: Chaperone was present for entire exam, Chaperone Name: Emily Pitts RN  Cervical Exam: 8/100/0  Pitocin: @ 6 mu/min    Membranes: Ruptured clear fluid  Scalp Electrode in place: absent  Intrauterine Pressure Catheter in Place: present    Interventions: IUPC placed without difficulty.  Amnioinfusion initiated    Assessment/Plan:  Jono Moeller is a 31 y.o. female  at 39w2d admitted for RR IOL   - GBS negative, No indication for GBS prophylaxis   - VSS, afebrile   - cEFM/TOCO   - S/p cytotec 25 mcg PV x1   - S/p Cooks balloon   - S/p  Morphine/phenergan x1   - AROM clear   - Epidural in place with adequate pain control   - IUPC in place with amnioinfusion running   - Continue pitocin per protocol   - Continue to monitor closely        Attending and senior resident updated and in agreement with plan    Abby Sheehan DO  Ob/Gyn Resident  9/15/2021, 1:06 AM

## 2021-09-15 NOTE — FLOWSHEET NOTE
Patient up to restroom with steady gait, able to void without difficulty, yobany care done per patient. Transferred to 7c room 738 via wheelchair with infant in arms. Bedside report to [facility].

## 2021-09-15 NOTE — LACTATION NOTE
Mom reports her baby has fed a few times since birth, but just doesn't want to feed right now. Reviewed feeding patterns in the first 24 hours. Packet of breastfeeding information given. Encouraged to call out as needed.

## 2021-09-15 NOTE — ANESTHESIA POSTPROCEDURE EVALUATION
Department of Anesthesiology  Postprocedure Note    Patient: Gisselle Haq  MRN: [id number]  YOB: 1995  Date of evaluation: 9/15/2021  Time:  4:00 AM     Procedure Summary     Date: 09/14/21 Room / Location:     Anesthesia Start: 2114 Anesthesia Stop: 09/15/21 0158    Procedure: Labor Analgesia Diagnosis:     Scheduled Providers:  Responsible Provider: Allen Angelo MD    Anesthesia Type: epidural ASA Status: 2          Anesthesia Type: epidural    Olinda Phase I: Olinda Score: 10    Olinda Phase II:      Last vitals: Reviewed and per EMR flowsheets.        Anesthesia Post Evaluation    Patient location during evaluation: floor  Patient participation: complete - patient participated  Level of consciousness: awake  Pain score: 0  Airway patency: patent  Nausea & Vomiting: no vomiting and no nausea  Complications: no  Cardiovascular status: blood pressure returned to baseline  Respiratory status: acceptable  Hydration status: stable

## 2021-09-15 NOTE — CARE COORDINATION
Social Work     Sw reviewed medical record (current active problem list) and tox screens and found no concerns. Sw spoke with mom briefly to explain Sw role, inquire if any needs or concerns, and provide safe sleep education and discuss. Mom denied any needs or questions and informs baby has a safe sleep environment (bassinet). Mom denied any current s/s of anxiety or depression and is aware to reach out to OB if any s/s occur after dc. Mom reports a good support system with fob and her mom and denied any current questions or needs. Mom reports she also has a 332 year old son and ped will be NP at [facility]. Sw encouraged mom to reach out if any issues or concerns arise.

## 2021-09-15 NOTE — CARE COORDINATION
POST-PARTUM/WIN TRANSITIONAL CARE PLAN    39 weeks gestation of pregnancy [Z3A.39]    Infant name on BC: [address] [facility]. Infant to WIN. Infant PCP [facility]. FOB: Maren Ambriz    Writer verified name/address/phone number correct on [employer] insurance correct. Writer notified Kathi Ventura has 30 days from date of birth to add  to insurance policy. Kathi Ventura and her  Delaney Parra verbalized understanding. Chichi verbalized has/have all necessary items for Sahil. No previous home care or dme. No Home Care or DME anticipated. Anticipate DC of couplet 21    CM continue to follow for any DC needs.

## 2021-09-16 VITALS
BODY MASS INDEX: 42.14 KG/M2 | DIASTOLIC BLOOD PRESSURE: 60 MMHG | HEIGHT: 62 IN | TEMPERATURE: 97.9 F | HEART RATE: 64 BPM | OXYGEN SATURATION: 99 % | RESPIRATION RATE: 18 BRPM | WEIGHT: 229 LBS | SYSTOLIC BLOOD PRESSURE: 103 MMHG

## 2021-09-16 LAB — SURGICAL PATHOLOGY REPORT: NORMAL

## 2021-09-16 PROCEDURE — 6370000000 HC RX 637 (ALT 250 FOR IP): Performed by: STUDENT IN AN ORGANIZED HEALTH CARE EDUCATION/TRAINING PROGRAM

## 2021-09-16 RX ADMIN — ACETAMINOPHEN 1000 MG: 500 TABLET ORAL at 10:07

## 2021-09-16 RX ADMIN — DOCUSATE SODIUM 100 MG: 100 CAPSULE ORAL at 08:23

## 2021-09-16 RX ADMIN — ACETAMINOPHEN 1000 MG: 500 TABLET ORAL at 04:11

## 2021-09-16 RX ADMIN — Medication 1 TABLET: at 08:24

## 2021-09-16 RX ADMIN — IBUPROFEN 600 MG: 600 TABLET, FILM COATED ORAL at 08:24

## 2021-09-16 ASSESSMENT — PAIN SCALES - GENERAL
PAINLEVEL_OUTOF10: 3
PAINLEVEL_OUTOF10: 4
PAINLEVEL_OUTOF10: 2

## 2021-09-16 NOTE — PLAN OF CARE
Problem: Discharge Planning:  Goal: Discharged to appropriate level of care  Description: Discharged to appropriate level of care  Outcome: Ongoing     Problem: Constipation:  Goal: Bowel elimination is within specified parameters  Description: Bowel elimination is within specified parameters  Outcome: Ongoing     Problem: Fluid Volume - Imbalance:  Goal: Absence of imbalanced fluid volume signs and symptoms  Description: Absence of imbalanced fluid volume signs and symptoms  Outcome: Ongoing  Goal: Absence of postpartum hemorrhage signs and symptoms  Description: Absence of postpartum hemorrhage signs and symptoms  Outcome: Ongoing     Problem: Infection - Risk of, Puerperal Infection:  Goal: Will show no infection signs and symptoms  Description: Will show no infection signs and symptoms  Outcome: Ongoing     Problem: Mood - Altered:  Goal: Mood stable  Description: Mood stable  Outcome: Ongoing     Problem: Pain - Acute:  Goal: Pain level will decrease  Description: Pain level will decrease  Outcome: Ongoing     Problem: Pain:  Description: Pain management should include both nonpharmacologic and pharmacologic interventions.   Goal: Pain level will decrease  Description: Pain level will decrease  Outcome: Ongoing  Goal: Control of acute pain  Description: Control of acute pain  Outcome: Ongoing  Goal: Control of chronic pain  Description: Control of chronic pain  Outcome: Ongoing

## 2021-09-16 NOTE — PROGRESS NOTES
CLINICAL PHARMACY NOTE: MEDS TO BEDS    Total # of Prescriptions Filled: 2   The following medications were delivered to the patient:  · Ibuprofen 600 mg tab  · senexon s 8.6- 50 mg tab  ·     Additional Documentation:Medications delivered to the patient in her room 738 @ 12:58 pm [place] payment.

## 2021-09-16 NOTE — PROGRESS NOTES
POST PARTUM DAY # 1    Brooklynn Loza is a 31 y.o. female  This patient was seen & examined today. Her pregnancy was complicated by:   Patient Active Problem List   Diagnosis    Stress incontinence of urine    Anxiety    Hx SAB x1    History of COVID-19    Persistent R umbilical vein (Fetal echo wnl)    Heterozygous MTHFR     39 weeks gestation of pregnancy    Circumvallate placenta     9/15/21 M Apg 8/9 Wt 8#4       Today she is doing well without any chief complaint. Her lochia is light. She denies chest pain, shortness of breath, lightheadedness, blurred vision and peripheral edema. She is  breast feeding and she denies any breast tenderness. She is ambulating well. Her voiding pattern is normal. I reviewed signs and symptoms of post partum depression with the patient, she currently denies any of these symptoms. She is tolerating solids. Vital Signs:  Vitals:    09/15/21 0808 09/15/21 1115 09/15/21 2000 21 0400   BP: 103/62 131/80 122/86 119/66   Pulse: 79 78 79 92   Resp: 18 18 18 16   Temp: 97.9 °F (36.6 °C) 97.9 °F (36.6 °C) 97.5 °F (36.4 °C) 97.7 °F (36.5 °C)   TempSrc: Oral Oral     SpO2: 98%  98%    Weight:       Height:             Physical Exam:  General:  no apparent distress, alert and cooperative  Neurologic:  alert, oriented, normal speech, no focal findings or movement disorder noted  Lungs:  No increased work of breathing, good air exchange  Heart:  regular rate and rhythm    Abdomen: abdomen soft, non-distended, non-tender  Fundus: non-tender, normal size, firm, below umbilicus  Extremities:  no calf tenderness, non edematous    Lab:  Lab Results   Component Value Date    HGB 14.4 2021     Lab Results   Component Value Date    HCT 43.3 2021       Assessment/Plan:  1.  Brooklynn Loza is a [id number] PPD # 1 s/p    - Doing well, requesting discharge home, VSS   - male infant in [address], circumcision completed   - Encourage ambulation   - Motrin/Tylenol for pain control  2. Rh positive/Rubella immune   - Rhogam, MMR not indicted    3. Breast feeding    - Denies s/s of mastitis    4. FHx thromboembolic event   - Patient's father    5. Anxiety   - Stable on no meds   - Encourage close follow up PP    6. Urinary stress incontinence   - Clinically asymptomatic    7. BMI 42    8. Continue post partum care    Counseling Completed:  Secondary Smoke risks and Sudden Infant Death Syndrome were reviewed with recommendations. Infant sleeping, \"back to sleep\" and avoidance of co-sleeping recommendations were reviewed. Signs and Symptoms of Post Partum Depression were reviewed. The patient is to call if any occur. Signs and symptoms of Mastitis were reviewed. The patient is to call if any occur for follow up.   Discharge instructions including pelvic rest, no driving with pain medicine and office follow-up were reviewed with patient     Attending Physician: Dr. Anay Piedra DO  Ob/Gyn Resident   9/16/2021, 6:12 AM

## 2021-09-16 NOTE — FLOWSHEET NOTE
I have reviewed all [facility] Post-Birth Warning Signs and essential teaching points for pulmonary embolism, cardiac disease, hypertensive disorders of pregnancy, obstetric hemorrhage, venous thromboembolism, infection, and postpartum depression with the patient and  . I have informed the patient on when to call their healthcare provider and when to call 911. I have discussed with the patient  the importance of scheduling a follow-up visit with their physician, nurse practitioner or midwife and provided them with correct contact information for appointment. I have provided the patient with a copy of the \"Save Your Life\" handout. The patient has acknowledged receiving and understanding this education with her signature. Written and verbal instruction given.

## 2021-09-16 NOTE — LACTATION NOTE
Baby currently latched and nursing well in cradle, tweaked positioning turning baby more into breast. Mom offers no concerns, discharge instructions and follow up reviewed.

## 2021-09-27 ENCOUNTER — OFFICE VISIT (OUTPATIENT)
Dept: PRIMARY CARE CLINIC | Age: 26
End: 2021-09-27
Payer: OTHER GOVERNMENT

## 2021-09-27 VITALS
OXYGEN SATURATION: 98 % | HEART RATE: 83 BPM | DIASTOLIC BLOOD PRESSURE: 60 MMHG | HEIGHT: 62 IN | SYSTOLIC BLOOD PRESSURE: 128 MMHG | BODY MASS INDEX: 38.87 KG/M2 | WEIGHT: 211.2 LBS

## 2021-09-27 DIAGNOSIS — H92.02 LEFT EAR PAIN: Primary | ICD-10-CM

## 2021-09-27 PROCEDURE — 99213 OFFICE O/P EST LOW 20 MIN: CPT | Performed by: FAMILY MEDICINE

## 2021-09-27 RX ORDER — AMOXICILLIN AND CLAVULANATE POTASSIUM 875; 125 MG/1; MG/1
1 TABLET, FILM COATED ORAL 2 TIMES DAILY
Qty: 14 TABLET | Refills: 0 | Status: SHIPPED | OUTPATIENT
Start: 2021-09-27 | End: 2021-10-04

## 2021-09-27 SDOH — ECONOMIC STABILITY: FOOD INSECURITY: WITHIN THE PAST 12 MONTHS, YOU WORRIED THAT YOUR FOOD WOULD RUN OUT BEFORE YOU GOT MONEY TO BUY MORE.: NEVER TRUE

## 2021-09-27 SDOH — ECONOMIC STABILITY: FOOD INSECURITY: WITHIN THE PAST 12 MONTHS, THE FOOD YOU BOUGHT JUST DIDN'T LAST AND YOU DIDN'T HAVE MONEY TO GET MORE.: NEVER TRUE

## 2021-09-27 ASSESSMENT — SOCIAL DETERMINANTS OF HEALTH (SDOH): HOW HARD IS IT FOR YOU TO PAY FOR THE VERY BASICS LIKE FOOD, HOUSING, MEDICAL CARE, AND HEATING?: NOT HARD AT ALL

## 2021-09-27 NOTE — PROGRESS NOTES
Subjective:      Patient ID: Chase Whitfield is a 31 y.o. female. A week of pain  No d/c  nursing      Review of Systems   Constitutional: Negative. HENT: Positive for ear pain. All other systems reviewed and are negative. Objective:   Physical Exam  Vitals reviewed. Constitutional:       Appearance: She is obese. HENT:      Head: Normocephalic. Comments: Left canal red and swollen     Right Ear: Ear canal normal.   Neurological:      Mental Status: She is alert. Psychiatric:         Mood and Affect: Mood normal.         Thought Content: Thought content normal.         Judgment: Judgment normal.         Assessment:      1. Left ear pain            Plan:      Krista Muñoz was seen today for otalgia. Diagnoses and all orders for this visit:    Left ear pain    Other orders  -     amoxicillin-clavulanate (AUGMENTIN) 875-125 MG per tablet;  Take 1 tablet by mouth 2 times daily for 7 days                Electronically signed by Jhonny Crowell MD on 9/27/2021 at 11:41 AM

## 2021-09-30 ENCOUNTER — POSTPARTUM VISIT (OUTPATIENT)
Dept: OBGYN CLINIC | Age: 26
End: 2021-09-30

## 2021-09-30 VITALS
BODY MASS INDEX: 38.27 KG/M2 | SYSTOLIC BLOOD PRESSURE: 116 MMHG | RESPIRATION RATE: 16 BRPM | WEIGHT: 209.25 LBS | DIASTOLIC BLOOD PRESSURE: 70 MMHG

## 2021-09-30 PROCEDURE — 99024 POSTOP FOLLOW-UP VISIT: CPT | Performed by: OBSTETRICS & GYNECOLOGY

## 2021-09-30 NOTE — PROGRESS NOTES
[facility] [place]  2021    YOB: 1995          HPI:  Monda Lennox is a 31 y.o. female      Patient doing well  States she and baby are getting along well  Large volumes of breast milk that she is actually donating! Wants to start progesterone OCP as soon as bleeding ends  Minimal lochia  No complaints or concerns except for tailbone feels bruised. 1 hr GTT: 127  Pap smear due ? Not one documented this pregnancy       OB History    Para Term  AB Living   3 2 2 0 1 2   SAB TAB Ectopic Molar Multiple Live Births   1 0 0 0 0 2      # Outcome Date GA Lbr Keith/2nd Weight Sex Delivery Anes PTL Lv   3 Term 09/15/21 39w2d / 00:30 8 lb 4.1 oz (3.745 kg) M Vag-Spont EPI N YARELI      Name: Coral Learn: 8  Apgar5: 9   2 SAB 2020 5w0d          1 Term 19 39w0d  7 lb 15 oz (3.6 kg) M Vag-Spont   YARELI      Birth Comments: 2-day NICU stay       No past medical history on file.     Past Surgical History:   Procedure Laterality Date    WISDOM TOOTH EXTRACTION         Family History   Problem Relation Age of Onset    Hypertension Mother        Social History     Socioeconomic History    Marital status:      Spouse name: Not on file    Number of children: Not on file    Years of education: Not on file    Highest education level: Not on file   Occupational History    Not on file   Tobacco Use    Smoking status: Never Smoker    Smokeless tobacco: Never Used   Vaping Use    Vaping Use: Never used   Substance and Sexual Activity    Alcohol use: Not Currently    Drug use: Never    Sexual activity: Yes     Partners: Male   Other Topics Concern    Not on file   Social History Narrative    Not on file     Social Determinants of Health     Financial Resource Strain: Low Risk     Difficulty of Paying Living Expenses: Not hard at all   Food Insecurity: No Food Insecurity    Worried About [address] in the Last Year: Never true    Ran Out of Food in the Last Year: Never true   Transportation Needs:     Lack of Transportation (Medical):  Lack of Transportation (Non-Medical):    Physical Activity:     Days of Exercise per Week:     Minutes of Exercise per Session:    Stress:     Feeling of Stress :    Social Connections:     Frequency of Communication with Friends and Family:     Frequency of Social Gatherings with Friends and Family:     Attends Shinto Services:     Active Member of Clubs or Organizations:     Attends Club or Organization Meetings:     Marital Status:    Intimate Partner Violence:     Fear of Current or Ex-Partner:     Emotionally Abused:     Physically Abused:     Sexually Abused:          MEDICATIONS:  Current Outpatient Medications   Medication Sig Dispense Refill    amoxicillin-clavulanate (AUGMENTIN) 875-125 MG per tablet Take 1 tablet by mouth 2 times daily for 7 days 14 tablet 0    ibuprofen (ADVIL;MOTRIN) 600 MG tablet Take 1 tablet by mouth every 6 hours as needed for Pain 30 tablet 0    sennosides-docusate sodium (SENOKOT-S) 8.6-50 MG tablet Take 1 tablet by mouth daily (Patient not taking: Reported on 2021) 30 tablet 0    FABB 2.2-25-1 MG TABS tablet daily      promethazine (PHENERGAN) 25 MG tablet Take 1 tablet by mouth every 6 hours as needed for Nausea (Patient not taking: Reported on 2021) 30 tablet 1    senna-docusate (PERICOLACE) 8.6-50 MG per tablet Take 2 tablets by mouth daily as needed for Constipation 60 tablet 1    Probiotic Product (PROBIOTIC-10 PO) Take by mouth       Cholecalciferol (VITAMIN D3) 50 MCG (2000 UT) CAPS Take by mouth      vitamin B-12 (CYANOCOBALAMIN) 100 MCG tablet Take 50 mcg by mouth daily      Prenatal Multivit-Min-Fe-FA (PRE- PO) Take 1 tablet by mouth daily       No current facility-administered medications for this visit.              ALLERGIES:  Allergies as of 2021    (No Known Allergies)           Review Of Systems (11 point):  Constitutional: No fever, chills or malaise; No weight change or fatigue  Head and Eyes: No vision, Headache, Dizziness or trauma in last 12 months  ENT ROS: No hearing, Tinnitis, sinus or taste problems  Hematological and Lymphatic ROS:No Lymphoma, Von Willebrand's, Hemophillia or Bleeding History  Psych ROS: No Depression, Homicidal thoughts,suicidal thoughts, or anxiety  Breast ROS: No prior breast abnormalities or lumps  Respiratory ROS: No SOB, Pneumoniae,Cough, or Pulmonary Embolism History  Cardiovascular ROS: No Chest Pain with Exertion, Palpitations, Syncope, Edema, Arrhythmia  Gastrointestinal ROS: No Indigestion, Heartburn, Nausea, vomiting, Diarrhea, Constipation,or Bowel Changes; No Bloody Stools or melena  Genito-Urinary ROS: No Dysuria, Hematuria or Nocturia. No Urinary Incontinence or Vaginal Discharge  Musculoskeletal ROS: No Arthralgia, Arthritis,Gout,Osteoporosis or Rheumatism  Neurological ROS: No CVA, Migraines, Epilepsy, Seizure Hx, or Limb Weakness  Dermatological ROS: No Rash, Itching, Hives, Mole Changes or Cancer          Blood pressure 116/70, resp. rate 16, weight 209 lb 4 oz (94.9 kg), last menstrual period 12/14/2020, currently breastfeeding. Abdomen: Soft non-tender; good bowel sounds. No guarding, rebound or rigidity. No CVA tenderness bilaterally. Extremities: No calf tenderness, DTR 2/4, and No edema bilaterally    Pelvic: Exam deferred. Diagnostics:  No results found. Lab Results:  Results for orders placed or performed during the hospital encounter of 09/14/21   COVID-19, Rapid    Specimen: Nasopharyngeal Swab   Result Value Ref Range    Specimen Description . NASOPHARYNGEAL SWAB     SARS-CoV-2, Rapid Not Detected Not Detected   T. pallidum Ab   Result Value Ref Range    T. pallidum, IgG NONREACTIVE NONREACTIVE   CBC auto differential   Result Value Ref Range    WBC 9.1 3.5 - 11.3 k/uL    RBC 4.47 3.95 - 5.11 m/uL    Hemoglobin 14.4 11.9 - 15.1 g/dL Hematocrit 43.3 36.3 - 47.1 %    MCV 96.9 82.6 - 102.9 fL    MCH 32.2 25.2 - 33.5 pg    MCHC 33.3 28.4 - 34.8 g/dL    RDW 13.5 11.8 - 14.4 %    Platelets 352 (L) 347 - 453 k/uL    MPV 12.8 8.1 - 13.5 fL    NRBC Automated 0.0 0.0 per 100 WBC    Differential Type NOT REPORTED     Seg Neutrophils 77 (H) 36 - 65 %    Lymphocytes 16 (L) 24 - 43 %    Monocytes 5 3 - 12 %    Eosinophils % 1 1 - 4 %    Basophils 0 0 - 2 %    Immature Granulocytes 1 (H) 0 %    Segs Absolute 6.98 1.50 - 8.10 k/uL    Absolute Lymph # 1.46 1.10 - 3.70 k/uL    Absolute Mono # 0.45 0.10 - 1.20 k/uL    Absolute Eos # 0.07 0.00 - 0.44 k/uL    Basophils Absolute 0.03 0.00 - 0.20 k/uL    Absolute Immature Granulocyte 0.08 0.00 - 0.30 k/uL    WBC Morphology NOT REPORTED     RBC Morphology NOT REPORTED     Platelet Estimate NOT REPORTED    DRUG SCREEN MULTI URINE   Result Value Ref Range    Amphetamine Screen, Ur NEGATIVE NEGATIVE    Barbiturate Screen, Ur NEGATIVE NEGATIVE    Benzodiazepine Screen, Urine NEGATIVE NEGATIVE    Cocaine Metabolite, Urine NEGATIVE NEGATIVE    Methadone Screen, Urine NEGATIVE NEGATIVE    Opiates, Urine NEGATIVE NEGATIVE    Phencyclidine, Urine NEGATIVE NEGATIVE    Propoxyphene, Urine NOT REPORTED NEGATIVE    Cannabinoid Scrn, Ur NEGATIVE NEGATIVE    Oxycodone Screen, Ur NEGATIVE NEGATIVE    Methamphetamine, Urine NOT REPORTED NEGATIVE    Tricyclic Antidepressants, Urine NOT REPORTED NEGATIVE    MDMA, Urine NOT REPORTED NEGATIVE    Buprenorphine Urine NOT REPORTED NEGATIVE    Test Information       Assay provides medical screening only. The absence of expected drug(s) and/or metabolite(s) may indicate diluted or adulterated urine, limitations of testing or timing of collection. SURGICAL PATHOLOGY REPORT   Result Value Ref Range    Surgical Pathology Report       -- Diagnosis --  PLACENTA, 39 2/7 WEEKS:  TERM PLACENTA WITH UNREMARKABLE MEMBRANES AND  THREE-VESSEL UMBILICAL CORD.       JEAN Dueñas  **Electronically Signed Out**         ajb       Clinical Information  Pre-op Diagnosis:  30 Y/O  @ 44 W, 2 D, RR IOL, PERSISTENT R  FETAL UMBILICAL VEIN, CIRCUMVALLATE PLACENTA, MTHFR MUTATION      Operation Performed:  , JEAN, AP/9, WT: 8#, 4 OZ, PLACENTA, CORD,  MEMBRANES    Source of Specimen  1: PLACENTA, CORD, AND MEMBRANES    Gross Description  \"PIPO [place]LEMORE, UNDESIGNATED\" Placenta with attached membranes  and umbilical cord. UMBILICAL CORD         Length:     37.0 cm       Diameter:     1.5 cm  True knots:     No  Number of vessels:     3  Spiraling:     Normal  Insertion into fetal surface:     Paracentral    MEMBRANES  Color:     Pink-tan, focally opacified with a partial circumvallate  insertion over 20% of the disc. The remaining insertion is marginal  Meconium staining:     No    FETAL SURFACE  Color:      Purple-gray, with a normal array of surface vessels  Subchorionic fibrin:     Patchy and marginal and involves  approximately 10% of the disc    MATERNAL SURFACE  Cotyledons:            -Fragmented/torn:     (Approximately 10%) and completeness cannot  be determined  -Focal lesions:     No    Placental size:     19.0 x 18.0 x 3.5 cm  Shape:     Ovoid  Weight:     575 grams  Number of cassettes:     3cs  tm      Microscopic Description  Umbilical cord:               Negative for funisitis  Membranes:               Negative for chorioamnionitis  Meconium staining:          No  Infarcts:                    No  Intervillous thrombi:          No  Subchorionic fibrin:          Slight increase  Villous maturation:          Mature  Nucleated erythrocytes in   villous capillaries:          Rare  Other:                    Few microcalcifications      SURGICAL PATHOLOGY CONSULTATION       Patient Name: Dez Coombs: [id number]  Path Number: [id number]    [address],  [address].   [place], [address]  [phone]  Fax: [phone]     TYPE AND SCREEN   Result Value Ref Range    Expiration Date 2021,2359     Arm Band Number BE [id number]     ABO/Rh O POSITIVE     Antibody Screen NEGATIVE          Assessment:   Diagnosis Orders   1.  9/15/21 M Apg 8/9 Wt 8#4       Patient Active Problem List    Diagnosis Date Noted     9/15/21 M Apg 8/9 Wt 8#4 09/15/2021    39 weeks gestation of pregnancy 2021    Circumvallate placenta 2021    Hx SAB x1 2021    History of COVID-19 2021     Reported, result not found      Persistent R umbilical vein (Fetal echo wnl) 2021     Fetal echo wnl 8/3/21      Heterozygous MTHFR  2021    Stress incontinence of urine 03/10/2021    Anxiety 03/10/2021         PLAN:  Restrictions reviewed  Discussed micronor start after 4-6 weeks postpartum  RTO 4 weeks routine postpartum visit      The encounter diagnosis was  9/15/21 M Apg 8/9 Wt 8#4. and Postpartum Care   as well as  counseling on preventative health maintenance follow-up.

## 2021-10-07 ENCOUNTER — OFFICE VISIT (OUTPATIENT)
Dept: PRIMARY CARE CLINIC | Age: 26
End: 2021-10-07
Payer: OTHER GOVERNMENT

## 2021-10-07 VITALS
SYSTOLIC BLOOD PRESSURE: 110 MMHG | BODY MASS INDEX: 38.28 KG/M2 | DIASTOLIC BLOOD PRESSURE: 74 MMHG | HEIGHT: 62 IN | HEART RATE: 77 BPM | OXYGEN SATURATION: 95 % | WEIGHT: 208 LBS

## 2021-10-07 DIAGNOSIS — H60.501 ACUTE OTITIS EXTERNA OF RIGHT EAR, UNSPECIFIED TYPE: Primary | ICD-10-CM

## 2021-10-07 PROCEDURE — 99213 OFFICE O/P EST LOW 20 MIN: CPT | Performed by: FAMILY MEDICINE

## 2021-10-07 RX ORDER — CIPROFLOXACIN AND DEXAMETHASONE 3; 1 MG/ML; MG/ML
4 SUSPENSION/ DROPS AURICULAR (OTIC) 2 TIMES DAILY
Qty: 5 ML | Refills: 0 | Status: SHIPPED | OUTPATIENT
Start: 2021-10-07 | End: 2021-10-14

## 2021-10-07 NOTE — PROGRESS NOTES
Subjective:      Patient ID: Alyssia Rajan is a 31 y.o. female. Left ear pain gone now the right is bothering      Review of Systems   Constitutional: Negative. All other systems reviewed and are negative. Objective:   Physical Exam  Vitals reviewed. Constitutional:       Appearance: Normal appearance. HENT:      Head: Normocephalic and atraumatic. Ears:      Comments: Left canal clear  Right is inflammed with some exudate  Neurological:      Mental Status: She is alert. Psychiatric:         Mood and Affect: Mood normal.         Thought Content: Thought content normal.         Assessment:      1. Acute otitis externa of right ear, unspecified type            Plan:      Oz Woods was seen today for otalgia.     Diagnoses and all orders for this visit:    Acute otitis externa of right ear, unspecified type    Other orders  -     ciprofloxacin-dexamethasone (CIPRODEX) 0.3-0.1 % otic suspension; Place 4 drops into the right ear 2 times daily for 7 days                Electronically signed by Gino Liu MD on 10/7/2021 at 12:48 PM

## 2021-10-26 ENCOUNTER — POSTPARTUM VISIT (OUTPATIENT)
Dept: OBGYN CLINIC | Age: 26
End: 2021-10-26

## 2021-10-26 VITALS — SYSTOLIC BLOOD PRESSURE: 128 MMHG | BODY MASS INDEX: 37.49 KG/M2 | WEIGHT: 205 LBS | DIASTOLIC BLOOD PRESSURE: 62 MMHG

## 2021-10-26 DIAGNOSIS — N39.3 STRESS INCONTINENCE OF URINE: ICD-10-CM

## 2021-10-26 PROCEDURE — 0503F POSTPARTUM CARE VISIT: CPT | Performed by: NURSE PRACTITIONER

## 2021-10-26 RX ORDER — DROSPIRENONE 4 MG/1
1 TABLET, FILM COATED ORAL DAILY
Qty: 28 TABLET | Refills: 3 | Status: SHIPPED | OUTPATIENT
Start: 2021-10-26 | End: 2021-10-26

## 2021-10-26 RX ORDER — DROSPIRENONE 4 MG/1
1 TABLET, FILM COATED ORAL DAILY
Qty: 84 TABLET | Refills: 1 | Status: SHIPPED | OUTPATIENT
Start: 2021-10-26 | End: 2021-11-09 | Stop reason: SDUPTHER

## 2021-10-26 ASSESSMENT — ENCOUNTER SYMPTOMS
COLOR CHANGE: 0
WHEEZING: 0
SHORTNESS OF BREATH: 0
VOMITING: 0
NAUSEA: 0

## 2021-10-26 NOTE — PATIENT INSTRUCTIONS
Depression After Childbirth: Care Instructions  Your Care Instructions    Many women get the \"baby blues\" during the first few days after childbirth. You may lose sleep, feel irritable, and cry easily. You may feel happy one minute and sad the next. Hormone changes are one cause of these emotional changes. Also, the demands of a new baby, along with visits from relatives or other family needs, add to a mother's stress. The \"baby blues\" often peak around the fourth day. Then they ease up in less than 2 weeks. If your moodiness or anxiety lasts for more than 2 weeks, or if you feel like life is not worth living, you may have postpartum depression. This is different for each mother. Some mothers with serious depression may worry intensely about their infant's well-being. Others may feel distant from their child. Some mothers might even feel that they might harm their baby. A mother may have signs of paranoia, wondering if someone is watching her. Depression is not a sign of weakness. It is a medical condition that requires treatment. Medicine and counseling often work well to reduce depression. Talk to your doctor about taking antidepressant medicine while breastfeeding. Follow-up care is a key part of your treatment and safety. Be sure to make and go to all appointments, and call your doctor if you are having problems. It's also a good idea to know your test results and keep a list of the medicines you take. How do you know if you are depressed? With all the changes in your life, you may not know if you are depressed. Pregnancy sometimes causes changes in how you feel that are similar to the symptoms of depression. Symptoms of depression include:  · Feeling sad or hopeless and losing interest in daily activities. These are the most common symptoms of depression. · Sleeping too much or not enough. · Feeling tired. You may feel as if you have no energy. · Eating too much or too little.   · Writing or talking about death, such as writing suicide notes or talking about guns, knives, or pills. Keep the numbers for these national suicide hotlines: 0-935-854-TALK (8-379.150.5548) and 8-290-LYSQRKJ (1-236.634.3889). If you or someone you know talks about suicide or feeling hopeless, get help right away. How can you care for yourself at home? · Be safe with medicines. Take your medicines exactly as prescribed. Call your doctor if you think you are having a problem with your medicine. · Eat a healthy diet so that you can keep up your energy. · Get regular daily exercise, such as walks, to help improve your mood. · Get as much sunlight as possible. Keep your shades and curtains open. Get outside as much as you can. · Avoid using alcohol or other substances to feel better. · Get as much rest and sleep as possible. Avoid doing too much. Being too tired can increase depression. · Play stimulating music throughout your day and soothing music at night. · Schedule outings and visits with friends and family. Ask them to call you regularly, so that you do not feel alone. · Ask for help with preparing food and other daily tasks. Family and friends are often happy to help a mother with a . · Be honest with yourself and those who care about you. Tell them about your struggle. · Join a support group of new mothers. No one can better understand the challenges of caring for a  than other new mothers. · If you feel like life is not worth living or are feeling hopeless, get help right away. Keep the numbers for these national suicide hotlines: -TALK (7-313.267.7420) and 0-939-HXBFLUA (7-694.363.7889). When should you call for help? Call 911 anytime you think you may need emergency care. For example, call if:    · You feel you cannot stop from hurting yourself, your baby, or someone else. Call your doctor now or seek immediate medical care if:    · You are having trouble caring for yourself or your baby. · You hear voices. Watch closely for changes in your health, and be sure to contact your doctor if:    · You have problems with your depression medicine. · You do not get better as expected. Where can you learn more? Go to https://halie.[employer]. org and sign in to your [employer] account. Enter S094 in the [facility] box to learn more about \"Depression After Childbirth: Care Instructions. \"     If you do not have an account, please click on the \"Sign Up Now\" link. Current as of: September 11, 2018  Content Version: 12.0  © 5305-5832 [employer]. Care instructions adapted under license by [facility] [employer] [facility] ([facility]). If you have questions about a medical condition or this instruction, always ask your healthcare professional. [address] any warranty or liability for your use of this information. Preeclampsia: Care Instructions  Overview    Preeclampsia occurs when a woman's blood pressure rises during pregnancy. Often with preeclampsia, you also have swelling in your legs, hands, and face. A test may show too much protein in your urine. Preeclampsia is also called toxemia. If preeclampsia is severe and not treated, it can lead to seizures (eclampsia) and damage to your liver or kidneys. Preeclampsia can prevent your baby from getting enough food and oxygen. This can cause a low birth weight or other problems. Your doctor will watch you closely to prevent these problems. He or she also may recommend that you reduce your activity. If your preeclampsia is a danger to your health or the health of your baby, your doctor may need to deliver your baby early. While preeclampsia is a concern, most women with preeclampsia have healthy babies. After a woman gives birth, preeclampsia usually goes away on its own. But symptoms may last a few weeks or more and can get worse after delivery.  Rarely, symptoms of preeclampsia don't show up until days or even weeks quitting for good. · Eat a balanced and healthy diet that has lots of fruits and vegetables. · If your doctor advised bed rest, be sure to stay off your feet and rest as much as possible. ? Keep a phone, phone book, notepad, and pen near the bed where you can easily reach them. ? Gently stretch your legs every hour to maintain good blood flow. ? Have another family member pack snacks and lunch food in a cooler close to your bed. ? Use this time for activities that you usually cannot find time for, such as reading, craft projects, or letter writing. · You can keep track of your baby's health by noting the length of time it takes to count 10 movements (such as kicks, flutters, or rolls). Feeling 10 movements in less than 1 hour is considered normal. Track your baby's movements once each day. Bring this record with you to each prenatal visit. When should you call for help? Call 911 anytime you think you may need emergency care. For example, call if:    · You passed out (lost consciousness). · You have a seizure. Call your doctor now or seek immediate medical care if:    · You have symptoms of preeclampsia, such as:  ? Sudden swelling of your face, hands, or feet. ? New vision problems (such as dimness or blurring). ? A severe headache. · Your blood pressure is higher than it should be, or it rises suddenly. · You have new nausea or vomiting. · You think that you are in labor. · You have pain in your belly or pelvis. Watch closely for changes in your health, and be sure to contact your doctor if:    · You gain weight rapidly. Where can you learn more? Go to https://Futuris.tkgraceeb.[employer]. org and sign in to your [employer] account. Enter J956 in the [employer] box to learn more about \"Preeclampsia: Care Instructions. \"     If you do not have an account, please click on the \"Sign Up Now\" link.   Current as of: September 5, 2018  Content Version: 12.0  © 0000-3687 Healthwise[employer]orporated. Care instructions adapted under license by [facility] ([facility]). If you have questions about a medical condition or this instruction, always ask your healthcare professional. [address] any warranty or liability for your use of this information. Mastitis: Care Instructions  Your Care Instructions  Mastitis is an inflammation of the breast. It occurs most often in women who are breastfeeding, but it can affect any woman. Mastitis can be caused by poor milk flow from the breast. When milk builds up in a breast, it leaks into the nearby breast tissue. Infection can also develop when the nipples become cracked or irritated. The tissue can then become infected with bacteria. Antibiotics can usually cure mastitis. For women who are nursing, continued breastfeeding (or pumping) can help. If mastitis is not treated, a pocket of pus may form in the breast and need to be drained. Follow-up care is a key part of your treatment and safety. Be sure to make and go to all appointments, and call your doctor if you are having problems. It's also a good idea to know your test results and keep a list of the medicines you take. How can you care for yourself at home? · If your doctor prescribed antibiotics, take them as directed. Do not stop taking them just because you feel better. You need to take the full course of antibiotics. · If you are breastfeeding, continue breastfeeding or pumping breast milk. It is important to empty your breasts regularly, every 2 to 3 hours while you are awake. These tips may help:  ? Before breastfeeding, place a warm, wet washcloth over your breast for about 15 minutes. Try this at least 3 times a day. This increases milk flow in the breast. Massaging the affected breast may also increase milk flow. ? Breastfeed on both sides.  Try to start with your healthy breast first. Then, after your milk is flowing, breastfeed from the affected breast until it feels soft. You should empty this breast completely. Then switch back to the healthy breast and breastfeed until your baby has finished. ? Pump or hand-express a small amount of breast milk before breastfeeding if your breasts are too full with milk. This will make your breasts less full and may make it easier for your baby to latch on to your breast.  ? Pump or express milk from the affected breast if it hurts too much to breastfeed. · Take an over-the-counter pain medicine, such as acetaminophen (Tylenol) or ibuprofen (Advil, Motrin) to relieve pain and fever. Read and follow all instructions on the label. · Do not take two or more pain medicines at the same time unless the doctor told you to. Many pain medicines have acetaminophen, which is Tylenol. Too much acetaminophen (Tylenol) can be harmful. · Rest as much as possible. · Drink extra fluids. · If pus is draining from your infected breast, wash the nipple gently and let it air-dry before you put your bra back on. A disposable breast pad placed in the bra cup may absorb the pus. · Sometimes, a blocked nipple pore, called a milk blister (or bleb) happens. This causes milk to back up in the breast. It can cause mastitis, so it's important to treat this if it happens. A milk blister is often a white dot on your nipple that can be painful. If a milk blister is causing you pain, it may help to place a warm, wet washcloth over the blister before breastfeeding or pumping. If this doesn't clear the blockage, your doctor can open the blister using a sterile needle. When should you call for help? Call your doctor now or seek immediate medical care if:    · You have worse symptoms of a breast infection, such as:  ? Increased pain, swelling, redness, or warmth around a breast.  ? Red streaks leading from a breast.  ? Pus draining from a breast.  ? A fever.     Watch closely for changes in your health, and be sure to contact your doctor if:    · You do not get better as expected. Where can you learn more? Go to https://chpepiceweb.[employer]. org and sign in to your "[employer] account. Enter Y207 in the [employer] box to learn more about \"Mastitis: Care Instructions. \"     If you do not have an account, please click on the \"Sign Up Now\" link. Current as of: September 5, 2018  Content Version: 12.0  © 0173-7223 [employer]. Care instructions adapted under license by [facility] ([facility]). If you have questions about a medical condition or this instruction, always ask your healthcare professional. [address] any warranty or liability for your use of this information. Learning About Safe Sleep for Babies  Why is safe sleep important? Enjoy your time with your baby, and know that you can do a few things to keep your baby safe. Following safe sleep guidelines can help prevent sudden infant death syndrome (SIDS) and reduce other sleep-related risks. SIDS is the death of a baby younger than 1 year with no known cause. Talk about these safety steps with your  providers, family, friends, and anyone else who spends time with your baby. Explain in detail what you expect them to do. Do not assume that people who care for your baby know these guidelines. What are the tips for safe sleep? Putting your baby to sleep  · Put your baby to sleep on his or her back, not on the side or tummy. This reduces the risk of SIDS. · Once your baby learns to roll from the back to the belly, you do not need to keep shifting your baby onto his or her back. But keep putting your baby down to sleep on his or her back. · Keep the room at a comfortable temperature so that your baby can sleep in lightweight clothes without a blanket. Usually, the temperature is about right if an adult can wear a long-sleeved T-shirt and pants without feeling cold. Make sure that your baby doesn't get too warm.  Your baby is likely too warm if he or she sweats or tosses and turns a lot. · Think about giving your baby a pacifier at nap time and bedtime if your doctor agrees. If your baby is , experts recommend waiting 3 or 4 weeks until breastfeeding is going well before offering a pacifier. · The American Academy of Pediatrics recommends that you do not sleep with your baby in the bed with you. · When your baby is awake and someone is watching, allow your baby to spend some time on his or her belly. This helps your baby get strong and may help prevent flat spots on the back of the head. Cribs, cradles, bassinets, and bedding  · For the first 6 months, have your baby sleep in a crib, cradle, or bassinet in the same room where you sleep. · Keep soft items and loose bedding out of the crib. Items such as blankets, stuffed animals, toys, and pillows could block your baby's mouth or trap your baby. Dress your baby in sleepers instead of using blankets. · Make sure that your baby's crib has a firm mattress (with a fitted sheet). Don't use sleep positioners, bumper pads, or other products that attach to crib slats or sides. They could block your baby's mouth or trap your baby. · Do not place your baby in a car seat, sling, swing, bouncer, or stroller to sleep. The safest place for a baby is in a crib, cradle, or bassinet that meets safety standards. What else is important to know? More about sudden infant death syndrome (SIDS)  SIDS is very rare. In most cases, a parent or other caregiver puts the babywho seems healthydown to sleep and returns later to find that the baby has . No one is at fault when a baby dies of SIDS. A SIDS death cannot be predicted, and in many cases it cannot be prevented. Doctors do not know what causes SIDS. It seems to happen more often in premature and low-birth-weight babies. It also is seen more often in babies whose mothers did not get medical care during the pregnancy and in babies whose mothers smoke.   Do not smoke or let anyone else smoke in the house or around your baby. Exposure to smoke increases the risk of SIDS. If you need help quitting, talk to your doctor about stop-smoking programs and medicines. These can increase your chances of quitting for good. Breastfeeding your child may help prevent SIDS. Be wary of products that are billed as helping prevent SIDS. Talk to your doctor before buying any product that claims to reduce SIDS risk. What to do while still pregnant  · See your doctor regularly. Women who see a doctor early in and throughout their pregnancies are less likely to have babies who die of SIDS. · Eat a healthy, balanced diet, which can help prevent a premature baby or a baby with a low birth weight. · Do not smoke or let anyone else smoke in the house or around you. Smoking or exposure to smoke during pregnancy increases the risk of SIDS. If you need help quitting, talk to your doctor about stop-smoking programs and medicines. These can increase your chances of quitting for good. · Do not drink alcohol or take illegal drugs. Alcohol or drug use may cause your baby to be born early. Follow-up care is a key part of your child's treatment and safety. Be sure to make and go to all appointments, and call your doctor if your child is having problems. It's also a good idea to know your child's test results and keep a list of the medicines your child takes. Where can you learn more? Go to https://chperichieweb.[employer]. org and sign in to your [employer] account. Enter B912 in the [facility] box to learn more about \"Learning About Safe Sleep for Babies. \"     If you do not have an account, please click on the \"Sign Up Now\" link. Current as of: December 12, 2018  Content Version: 12.0  © 1201-4311 Healthwise, Incorporated. Care instructions adapted under license by [facility] ([facility]).  If you have questions about a medical condition or this instruction, always ask your healthcare professional.

## 2021-10-26 NOTE — PROGRESS NOTES
[facility] PHYSICIANS  [facility] [address]  [address] [address]  Dept: [phone]  Dept Fax: [phone]    Gisell Carrillo is a 31 y.o. female who presents today for her medical conditions/complaintsas noted below. Gisell Carrillo is c/o of Postpartum Care        HPI:     Isabella Martino presents for 6 week postpartum check up. Delivered Vaginally on 9/15/21 No complaints, of chest pain, S.O.B. Headaches, no abdominal pain, GI or  issues. Breastfeeding Yes Signs mastitis No  Bleeding Yes light stopped then returned. Denies abdominal pain, vaginal d/c or odor,  fevers, chills  Birth control options POP  The patient completed the E.P.D.S. Evaluation form and scored 3. Denies suicidal/homicidal ideations or plans. Last PAP:2020 per pt states was negative, denies hx of abnormal.       OB History    Para Term  AB Living   3 2 2   1 2   SAB TAB Ectopic Molar Multiple Live Births   1       0 2      # Outcome Date GA Lbr Keith/2nd Weight Sex Delivery Anes PTL Lv   3 Term 09/15/21 39w2d / 00:30 8 lb 4.1 oz (3.745 kg) M Vag-Spont EPI N YARELI   2 SAB  5w0d          1 Term 19 39w0d  7 lb 15 oz (3.6 kg) M Vag-Spont   YARELI      Birth Comments: 2-day NICU stay       History reviewed. No pertinent past medical history.    Past Surgical History:   Procedure Laterality Date    WISDOM TOOTH EXTRACTION         Family History   Problem Relation Age of Onset    Hypertension Mother        Social History     Tobacco Use    Smoking status: Never Smoker    Smokeless tobacco: Never Used   Substance Use Topics    Alcohol use: Not Currently      Current Outpatient Medications   Medication Sig Dispense Refill    Drospirenone (SLYND) 4 MG TABS Take 1 tablet by mouth daily 28 tablet 3    ibuprofen (ADVIL;MOTRIN) 600 MG tablet Take 1 tablet by mouth every 6 hours as needed for Pain 30 tablet 0    FABB 2.2-25-1 MG TABS tablet daily      senna-docusate (PERICOLACE) 8.6-50 MG per tablet Take 2 tablets by mouth daily as needed for Constipation 60 tablet 1    Probiotic Product (PROBIOTIC-10 PO) Take by mouth       Cholecalciferol (VITAMIN D3) 50 MCG (2000 UT) CAPS Take by mouth      Prenatal Multivit-Min-Fe-FA (PRE- PO) Take 1 tablet by mouth daily       No current facility-administered medications for this visit. Allergies   Allergen Reactions    Cephalosporins Diarrhea       Health Maintenance   Topic Date Due    Hepatitis C screen  Never done    Varicella vaccine (1 of 2 - 2-dose childhood series) Never done    COVID-19 Vaccine (1) Never done    Pap smear  Never done    Flu vaccine (1) 2021    DTaP/Tdap/Td vaccine (2 - Td or Tdap) 2031    HIV screen  Completed    Hepatitis A vaccine  Aged Out    Hepatitis B vaccine  Aged Out    Hib vaccine  Aged Out    Meningococcal (ACWY) vaccine  Aged Out    Pneumococcal 0-64 years Vaccine  Aged Out       Subjective:     Review of Systems   Constitutional: Negative for chills and fever. Respiratory: Negative for shortness of breath and wheezing. Cardiovascular: Negative for chest pain, palpitations and leg swelling. Gastrointestinal: Negative for nausea and vomiting. Genitourinary: Positive for vaginal bleeding. Negative for dysuria, flank pain, frequency, genital sores, menstrual problem, pelvic pain, vaginal discharge and vaginal pain. Skin: Negative for color change and pallor. Neurological: Negative for dizziness, light-headedness and headaches. Hematological: Negative for adenopathy. Does not bruise/bleed easily. Psychiatric/Behavioral: Negative for self-injury and suicidal ideas. Objective:     Physical Exam  Vitals and nursing note reviewed. Constitutional:       General: She is not in acute distress. Appearance: She is well-developed. She is not diaphoretic. HENT:      Head: Normocephalic and atraumatic.       Right Ear: External ear normal.      Left Ear: External ear normal.      Nose: Nose normal.   Eyes:      Pupils: Pupils are equal, round, and reactive to light. Neck:      Thyroid: No thyromegaly. Cardiovascular:      Rate and Rhythm: Normal rate and regular rhythm. Heart sounds: Normal heart sounds. No murmur heard. No friction rub. No gallop. Comments: No calf tenderness or swelling  Pulmonary:      Effort: Pulmonary effort is normal.      Breath sounds: Normal breath sounds. No wheezing. Chest:      Breasts:         Right: No mass or tenderness. Left: No mass or tenderness. Abdominal:      General: Bowel sounds are normal.      Palpations: Abdomen is soft. Tenderness: There is no abdominal tenderness. There is no guarding. Genitourinary:     Comments: Pt declined  Musculoskeletal:         General: Normal range of motion. Cervical back: Normal range of motion and neck supple. Lymphadenopathy:      Cervical: No cervical adenopathy. Skin:     General: Skin is warm and dry. Findings: No rash. Neurological:      Mental Status: She is alert and oriented to person, place, and time. Cranial Nerves: No cranial nerve deficit. Psychiatric:         Behavior: Behavior normal.         Thought Content: Thought content normal.         Judgment: Judgment normal.       /62 (Site: Right Upper Arm, Position: Sitting, Cuff Size: Medium Adult)   Wt 205 lb (93 kg)   LMP 2020   Breastfeeding Yes   BMI 37.49 kg/m²     Assessment:     Normal Postpartum        Diagnosis Orders   1.  9/15/ M Apg /9 Wt 8#4  [facility] [place]   2.  Stress incontinence of urine  [facility] [place]       Plan:   Cont restrictions through 6 weeks- no lifting over 15 lb, pelvic rest  DVT prevention reviewed  Endometritis signs reviewed  Mastitis signs reviewed  PPD symptoms reviewed   Hx SI/- refer PFT  SIDs prevention reviewed   Contraception choices reviewed-BC mini pill& condoms, IUD,  Vasectomy   RV prn annual (PAP)2-3 months with med check      Orders Placed This Encounter   Procedures    [facility]     Referral Priority:   Routine     Referral Type:   Eval and Treat     Referral Reason:   Specialty Services Required     Requested Specialty:   Physical Therapy     Number of Visits Requested:   1     Orders Placed This Encounter   Medications    Drospirenone (SLYND) 4 MG TABS     Sig: Take 1 tablet by mouth daily     Dispense:  28 tablet     Refill:  3       Patient given educational materials - seepatient instructions. Discussed use, benefit, and side effects of prescribed medications. All patient questions answered. Pt voiced understanding. Reviewed health maintenance. Instructed to continue current medications, diet and exercise. Patient agreedwith treatment plan. Follow up as directed. Electronically signed by Oris Severs, APRN - CNP on 10/26/2021at 4:47 PM      Of the 20 minute duration appointment visit, Oris Severs CNP spent at least 50% of the face-to-face time in counseling, explanation of diagnosis, planning of further management, and answering all questions.

## 2021-11-01 ENCOUNTER — HOSPITAL ENCOUNTER (OUTPATIENT)
Dept: PHYSICAL THERAPY | Facility: CLINIC | Age: 26
Setting detail: THERAPIES SERIES
Discharge: HOME | End: 2021-11-01
Payer: OTHER GOVERNMENT

## 2021-11-01 NOTE — FLOWSHEET NOTE
[] [facility]) - [facility] &  Therapy  [address]    [phone]  F: [phone]   [] [address]  [facility] [address]   [address]  P: [phone]  F: [phone]  [] [address] &  Therapy  [address] [facility]  P: [phone]  F: [phone] [] [address]  P: [phone]  F: [phone]  [] [address]  [address] [address] [facility] [address]   [address]   [place]: [phone]  F: [phone]   [] [facility]  [address] [facility] [address]  [place]: [phone]   F: [phone]     Physical Therapy Cancel/No Show note    Date: 2021  Patient: Jatin Tilley  : 1995  MRN: [id number]    Cancels/No Shows to date:     For today's appointment patient:    [x]  Cancelled    [x] Rescheduled appointment    [] No-show     Reason given by patient:    []  Patient ill    []  Conflicting appointment    [] No transportation      [] Conflict with work    [] No reason given    [] Weather related    [] [id number]    [] Other:      Comments:        [] Next appointment was confirmed    Electronically signed by: Neema Willis

## 2021-11-08 ENCOUNTER — HOSPITAL ENCOUNTER (OUTPATIENT)
Dept: PHYSICAL THERAPY | Facility: CLINIC | Age: 26
Setting detail: THERAPIES SERIES
Discharge: HOME | End: 2021-11-08
Payer: OTHER GOVERNMENT

## 2021-11-08 ENCOUNTER — OFFICE VISIT (OUTPATIENT)
Dept: OBGYN CLINIC | Age: 26
End: 2021-11-08
Payer: OTHER GOVERNMENT

## 2021-11-08 VITALS — DIASTOLIC BLOOD PRESSURE: 62 MMHG | SYSTOLIC BLOOD PRESSURE: 126 MMHG | BODY MASS INDEX: 37.13 KG/M2 | WEIGHT: 203 LBS

## 2021-11-08 DIAGNOSIS — N93.9 ABNORMAL UTERINE BLEEDING (AUB): Primary | ICD-10-CM

## 2021-11-08 PROCEDURE — 97161 PT EVAL LOW COMPLEX 20 MIN: CPT

## 2021-11-08 PROCEDURE — 99213 OFFICE O/P EST LOW 20 MIN: CPT | Performed by: OBSTETRICS & GYNECOLOGY

## 2021-11-08 PROCEDURE — 97110 THERAPEUTIC EXERCISES: CPT

## 2021-11-08 NOTE — PROGRESS NOTES
Cecil Garcia [facility]  2021    YOB: 1995      HPI:  Sridevi Mendez is a 31 y.o. female [id number]     Patient states she bled for at least 4-5 weeks out of her first 6 weeks. Is strictly breastfeeding with excess milk supply, so no issues with breastfeeding. Had 1-2 weeks of NO bleeding and then Thursday of last week, patient had bright red bleeding and it was more than the amount of bleeding she experienced even at the heavy times right after delivery. Patient did not have an attempt of menses until 5 months postpartum with her last baby. Started mini pill last week. OB History    Para Term  AB Living   3 2 2 0 1 2   SAB IAB Ectopic Molar Multiple Live Births   1 0 0 0 0 2      # Outcome Date GA Lbr Keith/2nd Weight Sex Delivery Anes PTL Lv   3 Term 09/15/21 39w2d / 00:30 8 lb 4.1 oz (3.745 kg) M Vag-Spont EPI N YARELI      Name: Jamison Pierre: Andrew  Apgar5: 9   2 SAB 2020 5w0d          1 Term 19 39w0d  7 lb 15 oz (3.6 kg) M Vag-Spont   YARELI      Birth Comments: 2-day NICU stay       No past medical history on file.     Past Surgical History:   Procedure Laterality Date    WISDOM TOOTH EXTRACTION         Family History   Problem Relation Age of Onset    Hypertension Mother        Social History     Socioeconomic History    Marital status:      Spouse name: Not on file    Number of children: Not on file    Years of education: Not on file    Highest education level: Not on file   Occupational History    Not on file   Tobacco Use    Smoking status: Never Smoker    Smokeless tobacco: Never Used   Vaping Use    Vaping Use: Never used   Substance and Sexual Activity    Alcohol use: Not Currently    Drug use: Never    Sexual activity: Yes     Partners: Male   Other Topics Concern    Not on file   Social History Narrative    Not on file     Social Determinants of Health     Financial Resource Strain: Low Risk     Difficulty of Paying Living Expenses: Not hard at all   Food Insecurity: No Food Insecurity    Worried About Running Out of Food in the Last Year: Never true    Ran Out of Food in the Last Year: Never true   Transportation Needs:     Lack of Transportation (Medical): Not on file    Lack of Transportation (Non-Medical): Not on file   Physical Activity:     Days of Exercise per Week: Not on file    Minutes of Exercise per Session: Not on file   Stress:     Feeling of Stress : Not on file   Social Connections:     Frequency of Communication with Friends and Family: Not on file    Frequency of Social Gatherings with Friends and Family: Not on file    Attends Confucianist Services: Not on file    Active Member of [address] [place] [address] [employer] or Organizations: Not on file    Attends Club or Organization Meetings: Not on file    Marital Status: Not on file   Intimate Partner Violence:     Fear of Current or Ex-Partner: Not on file    Emotionally Abused: Not on file    Physically Abused: Not on file    Sexually Abused: Not on file   Housing Stability:     Unable to Pay for Housing in the Last Year: Not on file    Number of [place] in the Last Year: Not on file    Unstable Housing in the Last Year: Not on file         MEDICATIONS:  Current Outpatient Medications   Medication Sig Dispense Refill    SLYND 4 MG TABS TAKE 1 TABLET BY MOUTH DAILY 84 tablet 1    ibuprofen (ADVIL;MOTRIN) 600 MG tablet Take 1 tablet by mouth every 6 hours as needed for Pain 30 tablet 0    FABB 2.2-25-1 MG TABS tablet daily      senna-docusate (PERICOLACE) 8.6-50 MG per tablet Take 2 tablets by mouth daily as needed for Constipation 60 tablet 1    Probiotic Product (PROBIOTIC-10 PO) Take by mouth       Cholecalciferol (VITAMIN D3) 50 MCG (2000 UT) CAPS Take by mouth      Prenatal Multivit-Min-Fe-FA (PRE-LAITH PO) Take 1 tablet by mouth daily       No current facility-administered medications for this visit.              ALLERGIES:  Allergies as of 2021 - Fully Reviewed 11/08/2021   Allergen Reaction Noted    Cephalosporins Diarrhea 10/07/2021       Review Of Systems (11 point):  Constitutional: No fever, chills or malaise; No weight change or fatigue  Head and Eyes: No vision, Headache, Dizziness or trauma in last 12 months  ENT ROS: No hearing, Tinnitis, sinus or taste problems  Hematological and Lymphatic ROS:No Lymphoma, Von Willebrand's, Hemophillia or Bleeding History  Psych ROS: No Depression, Homicidal thoughts,suicidal thoughts, or anxiety  Breast ROS: No prior breast abnormalities or lumps  Respiratory ROS: No SOB, Pneumoniae,Cough, or Pulmonary Embolism History  Cardiovascular ROS: No Chest Pain with Exertion, Palpitations, Syncope, Edema, Arrhythmia  Gastrointestinal ROS: No Indigestion, Heartburn, Nausea, vomiting, Diarrhea, Constipation,or Bowel Changes; No Bloody Stools or melena  Genito-Urinary ROS: No Dysuria, Hematuria or Nocturia. No Urinary Incontinence or Vaginal Discharge  Musculoskeletal ROS: No Arthralgia, Arthritis,Gout,Osteoporosis or Rheumatism  Neurological ROS: No CVA, Migraines, Epilepsy, Seizure Hx, or Limb Weakness  Dermatological ROS: No Rash, Itching, Hives, Mole Changes or Cancer          Blood pressure 126/62, weight 203 lb (92.1 kg), last menstrual period 11/04/2021, not currently breastfeeding. Abdomen: Soft non-tender; good bowel sounds. No guarding, rebound or rigidity. No CVA tenderness bilaterally. Extremities: No calf tenderness, DTR 2/4, and No edema bilaterally    Pelvic: Exam deferred. Diagnostics:  No results found. Lab Results:  Results for orders placed or performed during the hospital encounter of 09/14/21   COVID-19, Rapid    Specimen: Nasopharyngeal Swab   Result Value Ref Range    Specimen Description . NASOPHARYNGEAL SWAB     SARS-CoV-2, Rapid Not Detected Not Detected   T. pallidum Ab   Result Value Ref Range    T. pallidum, IgG NONREACTIVE NONREACTIVE   CBC auto differential   Result Value Ref Range    WBC 9.1 3.5 - 11.3 k/uL    RBC 4.47 3.95 - 5.11 m/uL    Hemoglobin 14.4 11.9 - 15.1 g/dL    Hematocrit 43.3 36.3 - 47.1 %    MCV 96.9 82.6 - 102.9 fL    MCH 32.2 25.2 - 33.5 pg    MCHC 33.3 28.4 - 34.8 g/dL    RDW 13.5 11.8 - 14.4 %    Platelets 410 (L) 083 - 453 k/uL    MPV 12.8 8.1 - 13.5 fL    NRBC Automated 0.0 0.0 per 100 WBC    Differential Type NOT REPORTED     Seg Neutrophils 77 (H) 36 - 65 %    Lymphocytes 16 (L) 24 - 43 %    Monocytes 5 3 - 12 %    Eosinophils % 1 1 - 4 %    Basophils 0 0 - 2 %    Immature Granulocytes 1 (H) 0 %    Segs Absolute 6.98 1.50 - 8.10 k/uL    Absolute Lymph # 1.46 1.10 - 3.70 k/uL    Absolute Mono # 0.45 0.10 - 1.20 k/uL    Absolute Eos # 0.07 0.00 - 0.44 k/uL    Basophils Absolute 0.03 0.00 - 0.20 k/uL    Absolute Immature Granulocyte 0.08 0.00 - 0.30 k/uL    WBC Morphology NOT REPORTED     RBC Morphology NOT REPORTED     Platelet Estimate NOT REPORTED    DRUG SCREEN MULTI URINE   Result Value Ref Range    Amphetamine Screen, Ur NEGATIVE NEGATIVE    Barbiturate Screen, Ur NEGATIVE NEGATIVE    Benzodiazepine Screen, Urine NEGATIVE NEGATIVE    Cocaine Metabolite, Urine NEGATIVE NEGATIVE    Methadone Screen, Urine NEGATIVE NEGATIVE    Opiates, Urine NEGATIVE NEGATIVE    Phencyclidine, Urine NEGATIVE NEGATIVE    Propoxyphene, Urine NOT REPORTED NEGATIVE    Cannabinoid Scrn, Ur NEGATIVE NEGATIVE    Oxycodone Screen, Ur NEGATIVE NEGATIVE    Methamphetamine, Urine NOT REPORTED NEGATIVE    Tricyclic Antidepressants, Urine NOT REPORTED NEGATIVE    MDMA, Urine NOT REPORTED NEGATIVE    Buprenorphine Urine NOT REPORTED NEGATIVE    Test Information       Assay provides medical screening only. The absence of expected drug(s) and/or metabolite(s) may indicate diluted or adulterated urine, limitations of testing or timing of collection.    SURGICAL PATHOLOGY REPORT   Result Value Ref Range    Surgical Pathology Report       -- Diagnosis --  PLACENTA, 39 2/7 WEEKS:  TERM PLACENTA WITH UNREMARKABLE MEMBRANES AND  THREE-VESSEL UMBILICAL CORD. JEAN Looney  **Electronically Signed Out**         aj       Clinical Information  Pre-op Diagnosis:  32 Y/O  @ 44 W, 2 D, RR IOL, PERSISTENT R  FETAL UMBILICAL VEIN, CIRCUMVALLATE PLACENTA, MTHFR MUTATION      Operation Performed:  KODY, JEAN, AP/9, WT: 8#, 4 OZ, PLACENTA, CORD,  MEMBRANES    Source of Specimen  1: PLACENTA, CORD, AND MEMBRANES    Gross Description  \"PIP[place] Jie Boop" Placenta with attached membranes  and umbilical cord. UMBILICAL CORD         Length:     37.0 cm       Diameter:     1.5 cm  True knots:     No  Number of vessels:     3  Spiraling:     Normal  Insertion into fetal surface:     Paracentral    MEMBRANES  Color:     Pink-tan, focally opacified with a partial circumvallate  insertion over 20% of the disc.   The remaining insertion is marginal  Meconium staining:     No    FETAL SURFACE  Color:      Purple-gray, with a normal array of surface vessels  Subchorionic fibrin:     Patchy and marginal and involves  approximately 10% of the disc    MATERNAL SURFACE  Cotyledons:            -Fragmented/torn:     (Approximately 10%) and completeness cannot  be determined  -Focal lesions:     No    Placental size:     19.0 x 18.0 x 3.5 cm  Shape:     Ovoid  Weight:     575 grams  Number of cassettes:     3cs  tm      Microscopic Description  Umbilical cord:               Negative for funisitis  Membranes:               Negative for chorioamnionitis  Meconium staining:          No  Infarcts:                    No  Intervillous thrombi:          No  Subchorionic fibrin:          Slight increase  Villous maturation:          Mature  Nucleated erythrocytes in   villous capillaries:          Rare  Other:                    Few microcalcifications      SURGICAL PATHOLOGY CONSULTATION       Patient Name: Abiel Hurst: [id number]  Path Number: [id number]    [address] [place] [address] [address] [place] [address]. [facility], [address]  [phone]  Fax: [phone]     TYPE AND SCREEN   Result Value Ref Range    Expiration Date 2021,2359     Arm Band Number BE [id number]     ABO/Rh O POSITIVE     Antibody Screen NEGATIVE          Assessment:   Diagnosis Orders   1. Abnormal uterine bleeding (AUB)       Patient Active Problem List    Diagnosis Date Noted     9/15/21 M Apg  Wt 8#4 09/15/2021    39 weeks gestation of pregnancy 2021    Circumvallate placenta 2021    Hx SAB x1 2021    History of COVID-19 2021     Reported, result not found      Persistent R umbilical vein (Fetal echo wnl) 2021     Fetal echo wnl 8/3/21      Heterozygous MTHFR  2021    Stress incontinence of urine 03/10/2021    Anxiety 03/10/2021         PLAN:  Discussed with patient most likely her bleeding is her first menses or possibly residual debris/clots that were left over from her normal lochia and the tissue got stuck above her cervix that may have healed too quickly. If bleeding persists more than 10 days or gets progressively heavier, instructed patient to call office for follow up TVUS.

## 2021-11-09 RX ORDER — DROSPIRENONE 4 MG/1
1 TABLET, FILM COATED ORAL DAILY
Qty: 84 TABLET | Refills: 1 | Status: SHIPPED | OUTPATIENT
Start: 2021-11-09 | End: 2022-04-18

## 2021-11-09 NOTE — TELEPHONE ENCOUNTER
MIRZA OVER 180.00 THROUGH LOCAL PHARMACY- PT OK WITH TRYING TO SEND IT [facility].  RX PENDING

## 2021-11-14 NOTE — CONSULTS
[x] [facility]  Outpatient Rehabilitation &  Therapy  [address]  P: [phone]  F: [phone] [] [address]  P: [phone]  F: [phone] [] [address]  [facility]   [address]  P: [phone]  F: [phone]     Physical Therapy Pelvic Floor Evaluation    Date:  2021  Patient: Leatha Jones  : 1995  MRN: [id number]  Physician: Carroll Talbert St: Yaron Thrasher (vs per [place])  Medical Diagnosis: , Stress incontinence of urine    Rehab Codes: N39.3, M62.50  Onset Date: 9/15/21                                  Next 's appt: 21    Subjective:   CC: Pt is a 32 yo female who presents with complaints stress incontinence that has been ongoing since birth of first child but has worsened after uncomplicated recent vaginal birth 9/15/21. Pt currently breast feeding. HPI: (onset date:9/15/21 )    PMHx: [] Unremarkable [] Diabetes [] HTN  [] Pacemaker   [] MI/Heart Problems [] Cancer [] Arthritis [] Other:              [x] Refer to full medical chart  In EPIC     Comorbidities:   [] Obesity [] Dialysis  [] N/A   [] Asthma/COPD [] Dementia [] Other:   [] Stroke [] Sleep apnea [] Other:   [] Vascular disease [] Rheumatic disease [] Other:     Tests:   [] X-Ray: [] MRI:  [] Other:    Medications: [x] Refer to full medical record [] None [] Other:  Allergies:       [x] Refer to full medical record [] None [] Other:    Function:  Hand Dominance  [] Right  [] Left    Employer    Job Status []  Normal duty   [] Light duty   [] Off due to condition    []  Retired   [x] Not employed   [] Disability  [] Other:  []  Return to work:    Work activities/duties        Pain: [] Yes [x] No Location:  Pain Rating: (0-10 scale)0 /10  Pain altered Tx: [] Yes [x] No Action:      Symptoms: [] Improving [] Worsening [x] Same  Better:    [] Strength:  [x] ? Function:  [x] Other:      STG: (to be met in 4 treatments)  1. Able to isolate PF musculature  2. Elicit PF muscle contraction long enough to inhibit bladder contraction with cough, sneeze. laugh  3. ? Strength:PF 3/5  4. ? Function: no reports of leaking with cough, sneeze and laugh  5. Able to perform basic ADL's without PF pressure or prolapse  6. Independent with Home Exercise Programs  LTG: (to be met in 8 treatments)  1. PF strength 4/5  2. Able to perform all advanced ADL's without leaking  3. Able to perform all advanced ADL's without PF pressure or prolapse  4. Pt will report a 2-3 point improvement on DEANNA to indicate improved urogenital functioning. Patient goals: Not pee myself    Rehab Potential: [x] Good [] Fair [] Poor   Suggested Professional Referral: [x] No [] Yes:  Barriers to Goal Achievement[de-identified] [x] No [] Yes:  Domestic Concerns: [x] No [] Yes:      Pt. Education: [x] Plans/Goals, Risks/Benefits discussed [x] Home exercise program  Method of Education: [x] Verbal [x] Demo   [x] Written:  [x] Urine stop test  [x] PF ex: Long hold/quick flicks  [x] The Knack ex  [] Other:     Comprehension of Education:  [x] Verbalizes understanding. [x] Demonstrates understanding. [] Needs Review.       Treatment Plan:  [x] Therapeutic Exercise   10225  [] Iontophoresis: 4 mg/mL Dexamethasone Sodium Phosphate  mAmin  66814   [] Therapeutic Activity  48646 [] Vasopneumatic cold with compression  52105    [] Gait Training   93132 [] Ultrasound   68361   [] Neuromuscular Re-education  02094 [x] Electrical Stimulation Unattended  77244   [] Manual Therapy  94211 [] Electrical Stimulation Attended  57897   [x] Instruction in HEP  [] Lumbar/Cervical Traction  85624   [] Aquatic Therapy   25264 [] Cold/hotpack    [] Massage   25843      [] Dry Needling, 1 or 2 muscles  12529   [x] Biofeedback, first 15 minutes   97409  [] Biofeedback, additional 15 minutes   69813 [] Dry Needling, 3 or more muscles  69191       Frequency: 1 x/week for 8 visits      Todays Treatment:  Modalities:   Precautions:  Exercises:  Exercise Reps/ Time Weight/ Level Comments   Pelvic model explanation   [x]          Urine stop test   [x]          PF ex: long Holds   40-50   5 sec      PF ex: Quick flicks 59-92 1-2 sec    The knack ex   [x]                        Other:      Specific Instructions for next treatment:Biofeedback, ES, TA ex, Progress to  bridges, clamshell ex      Evaluation Complexity:  History (Personal factors, comorbidities) [x] 0 [] 1-2 [] 3+   Exam (limitations, restrictions) [x] 1-2 [] 3 [] 4+   Clinical presentation (progression) [x] Stable [] Evolving  [] Unstable   Decision Making [x] Low [] Moderate [] High    [x] Low Complexity [] Moderate Complexity [] High Complexity     Treatment Charges: Mins Units   [x] Evaluation(low complex) 20 1   [] Modalities: ES         [] Modalities: US     [x] Ther Exercise 15 1   [] Manual Therapy         [] Ther Activities     [] Aquatics     [] Vasocompression         [] Biofeedback         [] Other:         TOTAL TREATMENT TIME: 35      Time in: 1300  Time out:1340      Electronically signed by: Michelle Kennedy PT      Physician Signature:________________________________Date:__________________  By signing above or cosigning this note, I have reviewed this plan of care and certify a need for medically necessary rehabilitation services.        *PLEASE SIGN ABOVE AND FAX BACK ALL PAGES*

## 2021-11-17 ENCOUNTER — APPOINTMENT (OUTPATIENT)
Dept: PHYSICAL THERAPY | Facility: CLINIC | Age: 26
End: 2021-11-17
Payer: OTHER GOVERNMENT

## 2022-01-03 ENCOUNTER — OFFICE VISIT (OUTPATIENT)
Dept: PRIMARY CARE CLINIC | Age: 27
End: 2022-01-03
Payer: OTHER GOVERNMENT

## 2022-01-03 VITALS
HEIGHT: 61 IN | SYSTOLIC BLOOD PRESSURE: 110 MMHG | DIASTOLIC BLOOD PRESSURE: 60 MMHG | BODY MASS INDEX: 36.06 KG/M2 | WEIGHT: 191 LBS | OXYGEN SATURATION: 98 % | HEART RATE: 114 BPM

## 2022-01-03 DIAGNOSIS — R35.0 FREQUENCY OF URINATION: ICD-10-CM

## 2022-01-03 DIAGNOSIS — R10.2 PELVIC PAIN: ICD-10-CM

## 2022-01-03 DIAGNOSIS — R30.0 DYSURIA: Primary | ICD-10-CM

## 2022-01-03 LAB
BILIRUBIN, POC: NORMAL
BLOOD URINE, POC: NORMAL
CLARITY, POC: CLEAR
COLOR, POC: YELLOW
GLUCOSE URINE, POC: NORMAL
KETONES, POC: NORMAL
LEUKOCYTE EST, POC: NORMAL
NITRITE, POC: NORMAL
PH, POC: 6
PROTEIN, POC: NORMAL
SPECIFIC GRAVITY, POC: 1.02
UROBILINOGEN, POC: 0.2

## 2022-01-03 PROCEDURE — 81003 URINALYSIS AUTO W/O SCOPE: CPT | Performed by: FAMILY MEDICINE

## 2022-01-03 PROCEDURE — 99213 OFFICE O/P EST LOW 20 MIN: CPT | Performed by: FAMILY MEDICINE

## 2022-01-03 RX ORDER — AMOXICILLIN 500 MG/1
500 CAPSULE ORAL 3 TIMES DAILY
Qty: 21 CAPSULE | Refills: 0 | Status: SHIPPED | OUTPATIENT
Start: 2022-01-03 | End: 2022-01-10

## 2022-01-03 ASSESSMENT — ENCOUNTER SYMPTOMS
SORE THROAT: 0
SHORTNESS OF BREATH: 0
ABDOMINAL PAIN: 0
WHEEZING: 0

## 2022-01-03 NOTE — PROGRESS NOTES
Subjective:     Patient ID: Néstor Tello is a 31 y.o. female    HPI  Patient presents with a 1 week history of frequency difficulty urinating burning with urine and pelvic pain. She is 4 months postpartum and is still breast-feeding. She has had recurrent urinary tract infections in the past but none recently. Denies excessive vaginal discharge or bleeding. Denies fever chills gross blood in urine diarrhea      Review of Systems   Constitutional: Negative for fever. HENT: Negative for congestion, ear pain and sore throat. Respiratory: Negative for shortness of breath and wheezing. Cardiovascular: Negative for chest pain and leg swelling. Gastrointestinal: Negative for abdominal pain. Genitourinary: Positive for decreased urine volume, dysuria, frequency, pelvic pain and urgency. Negative for flank pain, hematuria, vaginal bleeding, vaginal discharge and vaginal pain. Skin: Negative for rash. Neurological: Negative for syncope. Hematological: Negative for adenopathy. Objective:     Physical Exam  Vitals and nursing note reviewed. Constitutional:       General: She is not in acute distress. Appearance: Normal appearance. HENT:      Head: Normocephalic. Right Ear: External ear normal.      Left Ear: External ear normal.      Nose: Nose normal.      Mouth/Throat:      Mouth: Mucous membranes are moist.   Eyes:      Conjunctiva/sclera: Conjunctivae normal.   Cardiovascular:      Rate and Rhythm: Normal rate and regular rhythm. Heart sounds: Normal heart sounds. Pulmonary:      Effort: Pulmonary effort is normal.      Breath sounds: Normal breath sounds. Abdominal:      General: There is no distension. Palpations: There is no mass. Tenderness: There is abdominal tenderness. There is no right CVA tenderness, left CVA tenderness, guarding or rebound.       Comments: Patient has some suprapubic tenderness no mass   Musculoskeletal:      Cervical back: Normal range of motion. Right lower leg: No edema. Left lower leg: No edema. Skin:     General: Skin is warm and dry. Neurological:      General: No focal deficit present. Mental Status: She is alert and oriented to person, place, and time. Psychiatric:         Mood and Affect: Mood normal.         Behavior: Behavior normal.       UA was clear  Assessment/Plan:     1. Dysuria    2. Frequency of urination    3. Pelvic pain            Lisseth Mejia was seen today for other. Diagnoses and all orders for this visit:    Dysuria  Most probably secondary to urinary tract infection. Will treat with amoxicillin 500 mg 3 times a day for 1 week  Frequency of urination  -     POCT Urinalysis No Micro (Auto)    Pelvic pain  -     US PELVIS COMPLETE; Future    Other orders  -     amoxicillin (AMOXIL) 500 MG capsule; Take 1 capsule by mouth 3 times daily for 7 days    Please call us if she is no better or if her symptoms get worse          Yasir Rodgers MD    Please note that this chart was generated using voice recognition Dragon dictation software. Although every effort was made to ensure the accuracy of this automated transcription, some errors in transcription may have occurred.

## 2022-04-18 RX ORDER — DROSPIRENONE 4 MG/1
TABLET, FILM COATED ORAL
Qty: 84 TABLET | Refills: 2 | Status: SHIPPED | OUTPATIENT
Start: 2022-04-18

## 2022-04-19 RX ORDER — DROSPIRENONE 4 MG/1
1 TABLET, FILM COATED ORAL DAILY
Qty: 28 TABLET | Refills: 3 | Status: SHIPPED | OUTPATIENT
Start: 2022-04-19

## 2022-04-27 ENCOUNTER — OFFICE VISIT (OUTPATIENT)
Dept: PRIMARY CARE CLINIC | Age: 27
End: 2022-04-27
Payer: OTHER GOVERNMENT

## 2022-04-27 ENCOUNTER — HOSPITAL ENCOUNTER (OUTPATIENT)
Age: 27
Setting detail: SPECIMEN
Discharge: HOME | End: 2022-04-27

## 2022-04-27 VITALS
WEIGHT: 184 LBS | BODY MASS INDEX: 34.74 KG/M2 | HEART RATE: 84 BPM | HEIGHT: 61 IN | SYSTOLIC BLOOD PRESSURE: 118 MMHG | DIASTOLIC BLOOD PRESSURE: 78 MMHG | OXYGEN SATURATION: 99 %

## 2022-04-27 DIAGNOSIS — H92.01 ACUTE OTALGIA, RIGHT: ICD-10-CM

## 2022-04-27 DIAGNOSIS — R10.9 FLANK PAIN: Primary | ICD-10-CM

## 2022-04-27 DIAGNOSIS — N92.6 MISSED PERIOD: ICD-10-CM

## 2022-04-27 LAB
BILIRUBIN, POC: NORMAL
BLOOD URINE, POC: NORMAL
CLARITY, POC: NORMAL
COLOR, POC: NORMAL
GLUCOSE URINE, POC: NORMAL
HCG QUALITATIVE: NEGATIVE
KETONES, POC: NORMAL
LEUKOCYTE EST, POC: NORMAL
NITRITE, POC: NORMAL
PH, POC: 7
PROTEIN, POC: NORMAL
SPECIFIC GRAVITY, POC: 1.01
UROBILINOGEN, POC: NORMAL

## 2022-04-27 PROCEDURE — 81002 URINALYSIS NONAUTO W/O SCOPE: CPT | Performed by: NURSE PRACTITIONER

## 2022-04-27 PROCEDURE — 99214 OFFICE O/P EST MOD 30 MIN: CPT | Performed by: NURSE PRACTITIONER

## 2022-04-27 ASSESSMENT — ENCOUNTER SYMPTOMS
CONSTIPATION: 0
RHINORRHEA: 0
BLOOD IN STOOL: 0
NAUSEA: 1
SHORTNESS OF BREATH: 0
ABDOMINAL PAIN: 0
DIARRHEA: 0
COUGH: 0
SORE THROAT: 0
BACK PAIN: 1
VOMITING: 0

## 2022-04-27 ASSESSMENT — PATIENT HEALTH QUESTIONNAIRE - PHQ9
SUM OF ALL RESPONSES TO PHQ9 QUESTIONS 1 & 2: 0
2. FEELING DOWN, DEPRESSED OR HOPELESS: 0
SUM OF ALL RESPONSES TO PHQ QUESTIONS 1-9: 0
1. LITTLE INTEREST OR PLEASURE IN DOING THINGS: 0
SUM OF ALL RESPONSES TO PHQ QUESTIONS 1-9: 0

## 2022-04-27 NOTE — PROGRESS NOTES
[address] WALK-IN  [facility],  [address]  [address] [place] [address]  Dept: [phone]    Lalito Mccoy is a 31 y.o. female Established patient, who presents to the walk-in clinic today with conditions/complaints as noted below:    Chief Complaint   Patient presents with    Flank Pain     bilateral flank pain, for about a week now, dull sharp pain, comes and goes         HPI:     Patient presents to walk in clinic with concerns about bilateral low back pain. Started about 2 weeks ago. Hurts worse today. Pain 4-5/10. Pain radiates around to her uterus. Takes mini pill. She is 7 months post partum. Did start a cycle before starting pill but has not had menstrual cycle since starting pill. Master [place]. Has taken 5 pregnancy tests and all negative. She thinks she saw faint line on 2 tests but her family told her no line seen. Nausea x3 weeks. She is breast feeding. Doesn't have appt with OB until  but concerned she may be pregnant. History reviewed. No pertinent past medical history. Current Outpatient Medications   Medication Sig Dispense Refill    Drospirenone (SLYND) 4 MG TABS Take 1 tablet by mouth daily 28 tablet 3    ibuprofen (ADVIL;MOTRIN) 600 MG tablet Take 1 tablet by mouth every 6 hours as needed for Pain 30 tablet 0    Probiotic Product (PROBIOTIC-10 PO) Take by mouth       Cholecalciferol (VITAMIN D3) 50 MCG ( UT) CAPS Take by mouth      Prenatal Multivit-Min-Fe-FA (PRE-LAITH PO) Take 1 tablet by mouth daily      SLYND 4 MG TABS TAKE ONE (1) TABLET BY MOUTH DAILY 84 tablet 2    FABB 2.2-25-1 MG TABS tablet daily (Patient not taking: Reported on 1/3/2022)      senna-docusate (PERICOLACE) 8.6-50 MG per tablet Take 2 tablets by mouth daily as needed for Constipation (Patient not taking: Reported on 1/3/2022) 60 tablet 1     No current facility-administered medications for this visit.        Allergies Allergen Reactions    Cephalosporins Diarrhea       :     Review of Systems   Constitutional: Negative for appetite change, chills and fever. HENT: Positive for ear pain (right). Negative for congestion, rhinorrhea and sore throat. Respiratory: Negative for cough and shortness of breath. Cardiovascular: Negative for chest pain. Gastrointestinal: Positive for nausea. Negative for abdominal pain, blood in stool, constipation, diarrhea and vomiting. Genitourinary: Positive for flank pain (bilateral). Negative for decreased urine volume, difficulty urinating, dysuria, frequency, hematuria and vaginal bleeding. 7 months post partum, no menstrual cycle, on mini pill   Musculoskeletal: Positive for back pain (bilateral low back). Negative for gait problem and neck pain. Skin: Negative for rash. Neurological: Negative for dizziness and headaches.       :     /78   Pulse 84   Ht 5' 1\" (1.549 m)   Wt 184 lb (83.5 kg)   SpO2 99%   BMI 34.77 kg/m²     Physical Exam  Vitals and nursing note reviewed. Constitutional:       General: She is not in acute distress. Appearance: She is well-developed. HENT:      Head: Normocephalic and atraumatic. Right Ear: Tympanic membrane and external ear normal.      Left Ear: Tympanic membrane and external ear normal.      Nose: Nose normal.   Cardiovascular:      Rate and Rhythm: Normal rate and regular rhythm. Pulses:           Radial pulses are 2+ on the right side and 2+ on the left side. Heart sounds: Normal heart sounds. Pulmonary:      Effort: Pulmonary effort is normal. No respiratory distress. Breath sounds: Normal breath sounds. No wheezing or rales. Abdominal:      General: Bowel sounds are normal. There is no distension. Palpations: Abdomen is soft. Tenderness: There is no abdominal tenderness. There is no right CVA tenderness, left CVA tenderness or guarding.    Musculoskeletal:      Cervical back: Neck supple. Thoracic back: Normal.      Lumbar back: Tenderness (paraspinal) present. No swelling, deformity, signs of trauma or bony tenderness. Normal range of motion. Comments: No red or swollen joints   Skin:     General: Skin is warm and dry. Findings: No rash. Neurological:      Mental Status: She is alert and oriented to person, place, and time. Psychiatric:         Mood and Affect: Mood normal.         Behavior: Behavior normal.           :          1. Flank pain  -     POCT Urinalysis no Micro  2. Missed period  -     hCG, Serum, Qualitative; Future  3. Acute otalgia, right     UA normal. No signs of infection or blood. She took 5 home pregnancy tests and she is requesting blood test. Will complete today. No ear infection. Follow up with OB GYN. OTC Tylenol as needed for pain. Call office with concerns.   :      Return if symptoms worsen or fail to improve. No orders of the defined types were placed in this encounter. Patient and/or parent given educational materials - see patient instructions. Discussed use, benefit, and side effects of prescribed medications. All patient questions answered. Patient and/or parent voiced understanding.       Electronically signed by STEPHANIE oRbbins 4/27/2022 at 2:37 PM

## 2022-08-15 ENCOUNTER — OFFICE VISIT (OUTPATIENT)
Dept: OBGYN CLINIC | Age: 27
End: 2022-08-15
Payer: OTHER GOVERNMENT

## 2022-08-15 ENCOUNTER — HOSPITAL ENCOUNTER (OUTPATIENT)
Age: 27
Setting detail: SPECIMEN
Discharge: HOME | End: 2022-08-15

## 2022-08-15 VITALS — BODY MASS INDEX: 34.58 KG/M2 | SYSTOLIC BLOOD PRESSURE: 116 MMHG | WEIGHT: 183 LBS | DIASTOLIC BLOOD PRESSURE: 70 MMHG

## 2022-08-15 DIAGNOSIS — Z01.419 VISIT FOR GYNECOLOGIC EXAMINATION: Primary | ICD-10-CM

## 2022-08-15 PROCEDURE — 99395 PREV VISIT EST AGE 18-39: CPT | Performed by: OBSTETRICS & GYNECOLOGY

## 2022-08-16 NOTE — PROGRESS NOTES
History and Physical    Mayo Kirby  8/15/2022              28 y.o. Chief Complaint   Patient presents with    Annual Exam       Patient's last menstrual period was 2022 (approximate). Primary Care Physician: Itzel Umanzor MD    The patient was seen and examined. She has no chief complaint today and is here for her annual exam.  Her bowels are regular. There are no voiding complaints. She denies any bloating. She denies vaginal discharge and was counseled on STD's and the need for barrier contraception. HPI : Mayo Kirby is a 28 y.o. female [id number]    No complaints. Declining cultures. Breastfeeding. No bowel/bladder concerns. No pain with sex  no Bloating  no Early Satiety  no Unexplained weight change of more than 15 lbs  no  PMB  no  PCB  ________________________________________________________________________  OB History    Para Term  AB Living   3 2 2 0 1 2   SAB IAB Ectopic Molar Multiple Live Births   1 0 0 0 0 2      # Outcome Date GA Lbr Keith/2nd Weight Sex Delivery Anes PTL Lv   3 Term 09/15/21 39w2d / 00:30 8 lb 4.1 oz (3.745 kg) M Vag-Spont EPI N YARELI      Name: Ashlie Horant: 8  Apgar5: 9   2 SAB 2020 5w0d          1 Term 19 39w0d  7 lb 15 oz (3.6 kg) M Vag-Spont   YARELI      Birth Comments: 2-day NICU stay     No past medical history on file.                                                                 Past Surgical History:   Procedure Laterality Date    WISDOM TOOTH EXTRACTION       Family History   Problem Relation Age of Onset    Hypertension Mother      Social History     Socioeconomic History    Marital status:      Spouse name: Not on file    Number of children: Not on file    Years of education: Not on file    Highest education level: Not on file   Occupational History    Not on file   Tobacco Use    Smoking status: Never    Smokeless tobacco: Never   Vaping Use    Vaping Use: Never used Substance and Sexual Activity    Alcohol use: Not Currently    Drug use: Never    Sexual activity: Yes     Partners: Male   Other Topics Concern    Not on file   Social History Narrative    Not on file     Social Determinants of Health     Financial Resource Strain: Low Risk     Difficulty of Paying Living Expenses: Not hard at all   Food Insecurity: No Food Insecurity    Worried About Running Out of Food in the Last Year: Never true    Ran Out of Food in the Last Year: Never true   Transportation Needs: Not on file   Physical Activity: Not on file   Stress: Not on file   Social Connections: Not on file   Intimate Partner Violence: Not on file   Housing Stability: Not on file       MEDICATIONS:  Current Outpatient Medications   Medication Sig Dispense Refill    Drospirenone (SLYND) 4 MG TABS Take 1 tablet by mouth daily 28 tablet 3    SLYND 4 MG TABS TAKE ONE (1) TABLET BY MOUTH DAILY 84 tablet 2    ibuprofen (ADVIL;MOTRIN) 600 MG tablet Take 1 tablet by mouth every 6 hours as needed for Pain (Patient not taking: Reported on 8/15/2022) 30 tablet 0    FABB 2.2-25-1 MG TABS tablet daily (Patient not taking: No sig reported)      senna-docusate (PERICOLACE) 8.6-50 MG per tablet Take 2 tablets by mouth daily as needed for Constipation (Patient not taking: No sig reported) 60 tablet 1    Probiotic Product (PROBIOTIC-10 PO) Take by mouth  (Patient not taking: Reported on 8/15/2022)      Cholecalciferol (VITAMIN D3) 50 MCG ( UT) CAPS Take by mouth (Patient not taking: Reported on 8/15/2022)      Prenatal Multivit-Min-Fe-FA (PRE-LAITH PO) Take 1 tablet by mouth daily (Patient not taking: Reported on 8/15/2022)       No current facility-administered medications for this visit. ALLERGIES:  Allergies as of 08/15/2022 - Fully Reviewed 08/15/2022   Allergen Reaction Noted    Cephalosporins Diarrhea 10/07/2021       Symptoms of decreased mood absent  Immunization status: up to date and documented.       Gynecologic History:       Patient's last menstrual period was 07/20/2022 (approximate). Sexually Active: Yes    STD History: No     Permanent Sterilization: No   Reversible Birth Control: Yes Slynd        Hormone Replacement Exposure: No      Genetic Qualified Family History of Breast, Ovarian , Colon or Uterine Cancer: No     If YES see scanned worksheet. Preventative Health Testing:    Health Maintenance:  Health Maintenance Due   Topic Date Due    Varicella vaccine (1 of 2 - 2-dose childhood series) Never done    Hepatitis C screen  Never done    Pap smear  Never done    COVID-19 Vaccine (3 - Booster for Pfizer series) 06/20/2022       Date of Last Pap Smear: n/a  Abnormal Pap Smear History: denies  Colposcopy History:   Date of Last Mammogram: denies  Date of Last Colonoscopy:   Date of Last Bone Density:      ________________________________________________________________________        REVIEW OF SYSTEMS:       A minimum of an eleven point review of systems was completed. Review Of Systems (11 point):  Constitutional: No fever, chills or malaise; No weight change or fatigue  Head and Eyes: No vision changes, Headache, Dizziness or trauma in last 12 months  ENT ROS: No hearing, Tinnitis, sinus or taste problems  Hematological and Lymphatic ROS:No Lymphoma, Von Willebrand's, Hemophillia or Bleeding History  Psych ROS: No Depression, Homicidal thoughts,suicidal thoughts, or anxiety  Breast ROS: No breast abnormalities or lumps  Respiratory ROS: No SOB, Pneumoniae,Cough, or Pulmonary Embolism   Cardiovascular ROS: No Chest Pain with Exertion, Palpitations, Syncope, Edema, Arrhythmia  Gastrointestinal ROS: No Indigestion, Heartburn, Nausea, vomiting, Diarrhea, Constipation,or Bowel Changes; No Bloody Stools or melena  Genito-Urinary ROS: No Dysuria, Hematuria or Nocturia.  No Urinary Incontinence or Vaginal Discharge  Musculoskeletal ROS: No Arthralgia, Arthritis,Gout,Osteoporosis or Rheumatism  Neurological ROS: No CVA, Migraines, Epilepsy, Seizure Hx, or Limb Weakness  Dermatological ROS: No Rash, Itching, Hives, Mole Changes or Cancer                                                                                                                                                                                                                                  PHYSICAL Exam:     Constitutional:  Vitals:    08/15/22 0947   BP: 116/70   Site: Right Upper Arm   Position: Sitting   Cuff Size: Medium Adult   Weight: 183 lb (83 kg)       Chaperone for Intimate Exam  Chaperone was offered and accepted as part of the rooming process. Chaperone: Robyn Crye          General Appearance: This  is a well Developed, well Nourished, well groomed female. Her BMI was reviewed. Nutritional decision making was discussed. Skin:  There was a Normal Inspection of the skin without rashes or lesions. There were no rashes. Lymphatic:  No Lymph Nodes were Palpable in the neck , axilla or groin.  0 # Of Lymph Nodes     Neck and EENT:  The neck was supple. There were no masses   The thyroid was not enlarged and had no masses. EOMI B/L    Respiratory: The lungs were auscultated and found to be clear. There were no rales, rhonchi or wheezes. There was a good respiratory effort. Cardiovascular: The heart was in a regular rate and rhythm. . No S3 or S4. There was no murmur appreciated. Location, grade, and radiation are not applicable. Extremities: The patients extremities were without calf tenderness, edema, or varicosities. There was full range of motion in all four extremities. Pulses in all four extremities were appreciated and are 2/4. Abdomen: The abdomen was soft and non-tender. There were good bowel sounds in all quadrants and there was no guarding, rebound or rigidity. Abdominal Scars:     Psych:   The patient had a normal Orientation to: Time, Place, Person, and Situation  There is no Mood / Affect changes    Breast:  (Chest)  normal appearance, no masses or tenderness  Self breast exams were reviewed in detail. Literature was given. Pelvic Exam:  External genitalia: normal general appearance  Urinary system: urethral meatus normal  Vaginal: normal mucosa without prolapse or lesions  Cervix: normal appearance  Adnexa: normal bimanual exam  Uterus: normal single, nontender    Rectal Exam:  exam declined by patient          Musculosk:  Normal Gait and station was noted. Digits were evaluated without abnormal findings. Range of motion, stability and strength were evaluated and found to be appropriate for the patients age. ASSESSMENT:      28 y.o. Annual   Diagnosis Orders   1. Visit for gynecologic examination               Chief Complaint   Patient presents with    Annual Exam          No past medical history on file. 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 Tobacco & Secondary smoke risks reviewed; instructed on cessation and avoidance      Counseling Completed:  Preventative Health Recommendations and Follow up. PLAN:  Return in about 1 year (around 8/15/2023), or if symptoms worsen or fail to improve, for annual.  Repeat Annual every 1 year  Cervical Cytology Evaluation begins at 24years old. If Negative Cytology, Follow-up screening per current guidelines. Birth control and barrier recommendations discussed. STD counseling and prevention reviewed.   Gardisil counseling completed for all patients 10-37 yo. Routine health maintenance per patients PCP. No orders of the defined types were placed in this encounter. The patient, Michael Hollingsworth is a 28 y.o. female, was seen with a total time spent of 20 minutes for the visit on this date of service by the E/M provider. The time component had both face to face and non face to face time spent in determining the total time component. Counseling and education regarding her diagnosis listed below and her options regarding those diagnoses were also included in determining her time component. Diagnosis Orders   1. Visit for gynecologic examination             The patient had her preventative health maintenance recommendations and follow-up reviewed with her at the completion of her visit.

## 2022-08-19 LAB — CYTOLOGY REPORT: NORMAL

## 2023-03-01 RX ORDER — DROSPIRENONE 4 MG/1
TABLET, FILM COATED ORAL
Qty: 28 TABLET | Refills: 3 | Status: SHIPPED | OUTPATIENT
Start: 2023-03-01

## 2023-05-18 RX ORDER — DROSPIRENONE 4 MG/1
TABLET, FILM COATED ORAL
Qty: 28 TABLET | Refills: 3 | OUTPATIENT
Start: 2023-05-18